# Patient Record
Sex: MALE | Race: WHITE | NOT HISPANIC OR LATINO | Employment: FULL TIME | URBAN - NONMETROPOLITAN AREA
[De-identification: names, ages, dates, MRNs, and addresses within clinical notes are randomized per-mention and may not be internally consistent; named-entity substitution may affect disease eponyms.]

---

## 2017-11-27 ENCOUNTER — TRANSCRIBE ORDERS (OUTPATIENT)
Dept: ADMINISTRATIVE | Facility: HOSPITAL | Age: 52
End: 2017-11-27

## 2017-11-27 DIAGNOSIS — Z12.9 SCREENING FOR CANCER: Primary | ICD-10-CM

## 2018-09-05 ENCOUNTER — TRANSCRIBE ORDERS (OUTPATIENT)
Dept: CT IMAGING | Facility: HOSPITAL | Age: 53
End: 2018-09-05

## 2018-09-05 ENCOUNTER — TELEPHONE (OUTPATIENT)
Dept: SURGERY | Facility: CLINIC | Age: 53
End: 2018-09-05

## 2018-09-05 DIAGNOSIS — Z12.9 SCREENING FOR CANCER: Primary | ICD-10-CM

## 2018-09-06 RX ORDER — BISACODYL 5 MG/1
10 TABLET, DELAYED RELEASE ORAL 2 TIMES DAILY
Qty: 4 TABLET | Refills: 0 | Status: SHIPPED | OUTPATIENT
Start: 2018-09-06 | End: 2018-09-07

## 2018-09-06 RX ORDER — POLYETHYLENE GLYCOL 1450
1 POWDER (GRAM) MISCELLANEOUS
Qty: 238 G | Refills: 0 | Status: SHIPPED | OUTPATIENT
Start: 2018-09-06 | End: 2018-09-06

## 2018-09-06 NOTE — TELEPHONE ENCOUNTER
I scheduled pt for open access screening colonoscopy on 09/24/2018 @  , all pertinent information mailed to pt. Rx's for bowel prep sent to [facility].

## 2018-09-11 ENCOUNTER — PREP FOR SURGERY (OUTPATIENT)
Dept: OTHER | Facility: HOSPITAL | Age: 53
End: 2018-09-11

## 2018-09-11 DIAGNOSIS — Z12.11 SCREEN FOR COLON CANCER: Primary | ICD-10-CM

## 2018-09-13 PROBLEM — Z12.11 SCREEN FOR COLON CANCER: Status: ACTIVE | Noted: 2018-09-13

## 2018-09-14 ENCOUNTER — HOSPITAL ENCOUNTER (OUTPATIENT)
Dept: CT IMAGING | Facility: HOSPITAL | Age: 53
Discharge: HOME | End: 2018-09-14
Admitting: INTERNAL MEDICINE

## 2018-09-14 DIAGNOSIS — Z12.9 SCREENING FOR CANCER: ICD-10-CM

## 2018-09-14 PROCEDURE — G0297 LDCT FOR LUNG CA SCREEN: HCPCS

## 2018-09-21 RX ORDER — LISINOPRIL 10 MG/1
10 TABLET ORAL DAILY
COMMUNITY

## 2018-09-24 ENCOUNTER — HOSPITAL ENCOUNTER (OUTPATIENT)
Facility: HOSPITAL | Age: 53
Setting detail: HOSPITAL OUTPATIENT SURGERY
Discharge: HOME | End: 2018-09-24
Attending: SURGERY | Admitting: SURGERY

## 2018-09-24 ENCOUNTER — ANESTHESIA (OUTPATIENT)
Dept: GASTROENTEROLOGY | Facility: HOSPITAL | Age: 53
End: 2018-09-24

## 2018-09-24 ENCOUNTER — ANESTHESIA EVENT (OUTPATIENT)
Dept: GASTROENTEROLOGY | Facility: HOSPITAL | Age: 53
End: 2018-09-24

## 2018-09-24 VITALS
HEART RATE: 58 BPM | BODY MASS INDEX: 21.76 KG/M2 | HEIGHT: 70 IN | WEIGHT: 152 LBS | RESPIRATION RATE: 18 BRPM | TEMPERATURE: 97.2 F | DIASTOLIC BLOOD PRESSURE: 89 MMHG | SYSTOLIC BLOOD PRESSURE: 151 MMHG | OXYGEN SATURATION: 99 %

## 2018-09-24 DIAGNOSIS — Z12.11 SCREEN FOR COLON CANCER: ICD-10-CM

## 2018-09-24 PROCEDURE — S0260 H&P FOR SURGERY: HCPCS | Performed by: SURGERY

## 2018-09-24 PROCEDURE — 25010000002 PROPOFOL 200 MG/20ML EMULSION: Performed by: NURSE ANESTHETIST, CERTIFIED REGISTERED

## 2018-09-24 RX ORDER — SODIUM CHLORIDE 0.9 % (FLUSH) 0.9 %
3 SYRINGE (ML) INJECTION AS NEEDED
Status: DISCONTINUED | OUTPATIENT
Start: 2018-09-24 | End: 2018-09-24 | Stop reason: HOSPADM

## 2018-09-24 RX ORDER — SODIUM CHLORIDE, SODIUM LACTATE, POTASSIUM CHLORIDE, CALCIUM CHLORIDE 600; 310; 30; 20 MG/100ML; MG/100ML; MG/100ML; MG/100ML
1000 INJECTION, SOLUTION INTRAVENOUS CONTINUOUS
Status: DISCONTINUED | OUTPATIENT
Start: 2018-09-24 | End: 2018-09-24 | Stop reason: HOSPADM

## 2018-09-24 RX ORDER — PROPOFOL 10 MG/ML
INJECTION, EMULSION INTRAVENOUS AS NEEDED
Status: DISCONTINUED | OUTPATIENT
Start: 2018-09-24 | End: 2018-09-24 | Stop reason: SURG

## 2018-09-24 RX ORDER — KETAMINE HYDROCHLORIDE 50 MG/ML
INJECTION, SOLUTION, CONCENTRATE INTRAMUSCULAR; INTRAVENOUS AS NEEDED
Status: DISCONTINUED | OUTPATIENT
Start: 2018-09-24 | End: 2018-09-24 | Stop reason: SURG

## 2018-09-24 RX ADMIN — SODIUM CHLORIDE, POTASSIUM CHLORIDE, SODIUM LACTATE AND CALCIUM CHLORIDE 1000 ML: 600; 310; 30; 20 INJECTION, SOLUTION INTRAVENOUS at 12:24

## 2018-09-24 RX ADMIN — PROPOFOL 30 MG: 10 INJECTION, EMULSION INTRAVENOUS at 14:09

## 2018-09-24 RX ADMIN — PROPOFOL 150 MG: 10 INJECTION, EMULSION INTRAVENOUS at 14:00

## 2018-09-24 RX ADMIN — PROPOFOL 20 MG: 10 INJECTION, EMULSION INTRAVENOUS at 13:57

## 2018-09-24 RX ADMIN — KETAMINE HYDROCHLORIDE 20 MG: 50 INJECTION, SOLUTION INTRAMUSCULAR; INTRAVENOUS at 13:57

## 2018-09-24 NOTE — H&P
Reason for Consultation:  Screening colonoscopy    Chief complaint :  Screening colonoscopy    Subjective .     History of present illness:  I did see the patient today as a consultation for evaluation and treatment of a need for screening colonoscopy.  There are no other complaints of.    Review of Systems:    Review of Systems - General ROS: negative for - chills, fatigue, fever, hot flashes, malaise or night sweats  Psychological ROS: negative for - behavioral disorder, disorientation, hallucinations, hostility or mood swings  ENT ROS: negative for - nasal polyps, oral lesions, sinus pain, sneezing or sore throat  Breast ROS: negative for - galactorrhea or new or changing breast lumps  Respiratory ROS: negative for - hemoptysis, orthopnea, pleuritic pain, sputum changes or stridor  Cardiovascular ROS: negative for - dyspnea on exertion, edema, irregular heartbeat, murmur, orthopnea, palpitations or rapid heart rate  Gastrointestinal ROS: negative for - change in stools, gas/bloating, hematemesis, melena or stool incontinence.  Genito-Urinary ROS: negative for - dysuria, genital ulcers, nocturia or pelvic pain  Musculoskeletal ROS: negative for - gait disturbance or muscle pain  Neurological ROS: negative for - dizziness, gait disturbance, memory loss, numbness/tingling or seizures      Objective     Vital Signs   Temp:  [98 °F (36.7 °C)] 98 °F (36.7 °C)  Heart Rate:  [67] 67  Resp:  [18] 18  BP: (126)/(84) 126/84    Physical Exam:     General Appearance:    Alert, cooperative, in no acute distress   Head:    Normocephalic, without obvious abnormality, atraumatic   Eyes:            Lids and lashes normal, conjunctivae and sclerae normal, no   icterus, no pallor, corneas clear, PERRLA   Ears:    Ears appear intact with no abnormalities noted   Throat:   No oral lesions, no thrush, oral mucosa moist   Neck:   No adenopathy, supple, trachea midline, no thyromegaly, no   carotid bruit, no JVD   Back:     No  kyphosis present, no scoliosis present, no skin lesions,      erythema or scars, no tenderness to percussion or                   palpation,   range of motion normal   Lungs:     Clear to auscultation,respirations regular, even and                  unlabored    Heart:    Regular rhythm and normal rate, normal S1 and S2, no            murmur, no gallop, no rub, no click   Chest Wall:    No abnormalities observed   Abdomen:     Normal bowel sounds, no masses, no organomegaly, soft        non-tender, non-distended, no guarding, there is no evidence of tenderness   Extremities:   Moves all extremities well, no edema, no cyanosis, no             redness   Pulses:   Pulses palpable and equal bilaterally   Skin:   No bleeding, bruising or rash   Lymph nodes:   No palpable adenopathy   Neurologic:   Cranial nerves 2 - 12 grossly intact, sensation intact, DTR       present and equal bilaterally           Assessment/Plan     Screening colonoscopy      I did have a detailed and extensive discussion with the patient in the office today.  The full risks and benefits of operative versus nonoperative intervention were discussed with the paient, they understand, agree, and wish to proceed with the surgical treatment plan of colonoscopy.      Xavi Lyman MD

## 2018-09-24 NOTE — DISCHARGE INSTRUCTIONS
Please follow all post op instructions and follow up appointment time from your physician's office included in your discharge packet.  .   No pushing, pulling, tugging,  heavy lifting, or strenuous activity.  No major decision making, driving, or drinking alcoholic beverages for 24 hours. ( due to the medications you have  received)  Always use good hand hygiene/washing techniques.  NO driving while taking pain medications.To assist you in voiding:  Drink plenty of fluids  Listen to running water while attempting to void.    If you are unable to urinate and you have an uncomfortable urge to void or it has been   6 hours since you were discharged, return to the Emergency Room

## 2018-09-24 NOTE — ANESTHESIA POSTPROCEDURE EVALUATION
Patient: Mihir Jha    Procedure Summary     Date:  09/24/18 Room / Location:  [facility] ENDOSCOPY 3 / [facility] ENDOSCOPY    Anesthesia Start:  1353 Anesthesia Stop:  1413    Procedure:  COLONOSCOPY WITH HOT BIOPSY POLYPECTOMY (N/A ) Diagnosis:       Screen for colon cancer      (Screen for colon cancer [Z12.11])    Surgeon:  Xavi Lyman MD Provider:  Sam Holloway CRNA    Anesthesia Type:  MAC ASA Status:  2          Anesthesia Type: MAC  Last vitals  BP   126/84 (09/24/18 1213)   Temp   98 °F (36.7 °C) (09/24/18 1213)   Pulse   67 (09/24/18 1213)   Resp   18 (09/24/18 1213)     SpO2   98 % (09/24/18 1213)     Post Anesthesia Care and Evaluation    Patient location during evaluation: PHASE II  Patient participation: complete - patient participated  Level of consciousness: awake and alert  Pain score: 0  Pain management: satisfactory to patient  Airway patency: patent  Anesthetic complications: No anesthetic complications  PONV Status: none  Cardiovascular status: acceptable and hemodynamically stable  Respiratory status: acceptable  Hydration status: acceptable

## 2018-09-24 NOTE — ANESTHESIA PREPROCEDURE EVALUATION
Anesthesia Evaluation     Patient summary reviewed and Nursing notes reviewed   NPO Solid Status: > 8 hours  NPO Liquid Status: > 6 hours           Airway   Mallampati: II  TM distance: >3 FB  Neck ROM: full  No difficulty expected  Dental - normal exam   (+) edentulous    Pulmonary - normal exam   (+) a smoker Current Abstained day of surgery,   Cardiovascular - normal exam  Exercise tolerance: good (4-7 METS)    (+) hypertension,       Neuro/Psych- negative ROS  GI/Hepatic/Renal/Endo    (+)  GERD,      Musculoskeletal     Abdominal  - normal exam    Bowel sounds: normal.   Substance History - negative use     OB/GYN negative ob/gyn ROS         Other   (+) arthritis                     Anesthesia Plan    ASA 2     MAC     intravenous induction   Anesthetic plan, all risks, benefits, and alternatives have been provided, discussed and informed consent has been obtained with: patient.

## 2018-09-28 LAB
LAB AP CASE REPORT: NORMAL
PATH REPORT.FINAL DX SPEC: NORMAL

## 2019-07-26 ENCOUNTER — TRANSCRIBE ORDERS (OUTPATIENT)
Dept: CT IMAGING | Facility: HOSPITAL | Age: 54
End: 2019-07-26

## 2019-07-26 DIAGNOSIS — Z87.891 PERSONAL HISTORY OF TOBACCO USE, PRESENTING HAZARDS TO HEALTH: Primary | ICD-10-CM

## 2019-09-18 ENCOUNTER — HOSPITAL ENCOUNTER (OUTPATIENT)
Dept: CT IMAGING | Facility: HOSPITAL | Age: 54
Discharge: HOME | End: 2019-09-18
Admitting: INTERNAL MEDICINE

## 2019-09-18 DIAGNOSIS — Z87.891 PERSONAL HISTORY OF TOBACCO USE, PRESENTING HAZARDS TO HEALTH: ICD-10-CM

## 2019-09-18 PROCEDURE — G0297 LDCT FOR LUNG CA SCREEN: HCPCS

## 2020-09-18 ENCOUNTER — TRANSCRIBE ORDERS (OUTPATIENT)
Dept: ADMINISTRATIVE | Facility: HOSPITAL | Age: 55
End: 2020-09-18

## 2020-09-18 DIAGNOSIS — Z12.9 SCREENING FOR CANCER: Primary | ICD-10-CM

## 2021-09-07 ENCOUNTER — APPOINTMENT (OUTPATIENT)
Dept: CT IMAGING | Facility: HOSPITAL | Age: 56
End: 2021-09-07

## 2021-09-07 ENCOUNTER — HOSPITAL ENCOUNTER (OUTPATIENT)
Facility: HOSPITAL | Age: 56
Discharge: HOME | End: 2021-09-08
Attending: EMERGENCY MEDICINE | Admitting: UROLOGY

## 2021-09-07 DIAGNOSIS — N20.0 KIDNEY STONE ON LEFT SIDE: Primary | ICD-10-CM

## 2021-09-07 LAB
ALBUMIN SERPL-MCNC: 4.8 G/DL (ref 3.5–5.2)
ALBUMIN/GLOB SERPL: 2.1 G/DL
ALP SERPL-CCNC: 77 U/L (ref 39–117)
ALT SERPL W P-5'-P-CCNC: 34 U/L (ref 1–41)
ANION GAP SERPL CALCULATED.3IONS-SCNC: 12 MMOL/L (ref 5–15)
AST SERPL-CCNC: 23 U/L (ref 1–40)
BACTERIA UR QL AUTO: ABNORMAL /HPF
BASOPHILS # BLD AUTO: 0.04 10*3/MM3 (ref 0–0.2)
BASOPHILS NFR BLD AUTO: 0.5 % (ref 0–1.5)
BILIRUB SERPL-MCNC: 0.4 MG/DL (ref 0–1.2)
BILIRUB UR QL STRIP: NEGATIVE
BUN SERPL-MCNC: 27 MG/DL (ref 6–20)
BUN/CREAT SERPL: 23.9 (ref 7–25)
CALCIUM SPEC-SCNC: 10.2 MG/DL (ref 8.6–10.5)
CHLORIDE SERPL-SCNC: 102 MMOL/L (ref 98–107)
CLARITY UR: ABNORMAL
CO2 SERPL-SCNC: 25 MMOL/L (ref 22–29)
COLOR UR: YELLOW
CREAT SERPL-MCNC: 1.13 MG/DL (ref 0.76–1.27)
DEPRECATED RDW RBC AUTO: 42.2 FL (ref 37–54)
EOSINOPHIL # BLD AUTO: 0.04 10*3/MM3 (ref 0–0.4)
EOSINOPHIL NFR BLD AUTO: 0.5 % (ref 0.3–6.2)
ERYTHROCYTE [DISTWIDTH] IN BLOOD BY AUTOMATED COUNT: 12.8 % (ref 12.3–15.4)
GFR SERPL CREATININE-BSD FRML MDRD: 67 ML/MIN/1.73
GLOBULIN UR ELPH-MCNC: 2.3 GM/DL
GLUCOSE SERPL-MCNC: 126 MG/DL (ref 65–99)
GLUCOSE UR STRIP-MCNC: NEGATIVE MG/DL
HCT VFR BLD AUTO: 45.8 % (ref 37.5–51)
HGB BLD-MCNC: 15.7 G/DL (ref 13–17.7)
HGB UR QL STRIP.AUTO: ABNORMAL
HOLD SPECIMEN: NORMAL
HOLD SPECIMEN: NORMAL
HYALINE CASTS UR QL AUTO: ABNORMAL /LPF
IMM GRANULOCYTES # BLD AUTO: 0.02 10*3/MM3 (ref 0–0.05)
IMM GRANULOCYTES NFR BLD AUTO: 0.2 % (ref 0–0.5)
KETONES UR QL STRIP: NEGATIVE
LEUKOCYTE ESTERASE UR QL STRIP.AUTO: NEGATIVE
LIPASE SERPL-CCNC: 32 U/L (ref 13–60)
LYMPHOCYTES # BLD AUTO: 1.03 10*3/MM3 (ref 0.7–3.1)
LYMPHOCYTES NFR BLD AUTO: 12.5 % (ref 19.6–45.3)
MCH RBC QN AUTO: 30.8 PG (ref 26.6–33)
MCHC RBC AUTO-ENTMCNC: 34.3 G/DL (ref 31.5–35.7)
MCV RBC AUTO: 89.8 FL (ref 79–97)
MONOCYTES # BLD AUTO: 0.48 10*3/MM3 (ref 0.1–0.9)
MONOCYTES NFR BLD AUTO: 5.8 % (ref 5–12)
NEUTROPHILS NFR BLD AUTO: 6.62 10*3/MM3 (ref 1.7–7)
NEUTROPHILS NFR BLD AUTO: 80.5 % (ref 42.7–76)
NITRITE UR QL STRIP: NEGATIVE
NRBC BLD AUTO-RTO: 0 /100 WBC (ref 0–0.2)
PH UR STRIP.AUTO: 6.5 [PH] (ref 5–8)
PLATELET # BLD AUTO: 209 10*3/MM3 (ref 140–450)
PMV BLD AUTO: 9.3 FL (ref 6–12)
POTASSIUM SERPL-SCNC: 4 MMOL/L (ref 3.5–5.2)
PROT SERPL-MCNC: 7.1 G/DL (ref 6–8.5)
PROT UR QL STRIP: ABNORMAL
RBC # BLD AUTO: 5.1 10*6/MM3 (ref 4.14–5.8)
RBC # UR: ABNORMAL /HPF
REF LAB TEST METHOD: ABNORMAL
SARS-COV-2 RNA PNL SPEC NAA+PROBE: NOT DETECTED
SODIUM SERPL-SCNC: 139 MMOL/L (ref 136–145)
SP GR UR STRIP: 1.02 (ref 1–1.03)
SQUAMOUS #/AREA URNS HPF: ABNORMAL /HPF
UROBILINOGEN UR QL STRIP: ABNORMAL
WBC # BLD AUTO: 8.23 10*3/MM3 (ref 3.4–10.8)
WBC UR QL AUTO: ABNORMAL /HPF
WHOLE BLOOD HOLD SPECIMEN: NORMAL
WHOLE BLOOD HOLD SPECIMEN: NORMAL

## 2021-09-07 PROCEDURE — G0378 HOSPITAL OBSERVATION PER HR: HCPCS

## 2021-09-07 PROCEDURE — 25010000002 IOPAMIDOL 61 % SOLUTION: Performed by: EMERGENCY MEDICINE

## 2021-09-07 PROCEDURE — 74177 CT ABD & PELVIS W/CONTRAST: CPT

## 2021-09-07 PROCEDURE — 87635 SARS-COV-2 COVID-19 AMP PRB: CPT | Performed by: PHYSICIAN ASSISTANT

## 2021-09-07 PROCEDURE — 99284 EMERGENCY DEPT VISIT MOD MDM: CPT

## 2021-09-07 PROCEDURE — 96375 TX/PRO/DX INJ NEW DRUG ADDON: CPT

## 2021-09-07 PROCEDURE — 96376 TX/PRO/DX INJ SAME DRUG ADON: CPT

## 2021-09-07 PROCEDURE — 85025 COMPLETE CBC W/AUTO DIFF WBC: CPT | Performed by: EMERGENCY MEDICINE

## 2021-09-07 PROCEDURE — 25010000002 ONDANSETRON PER 1 MG: Performed by: EMERGENCY MEDICINE

## 2021-09-07 PROCEDURE — 99219 PR INITIAL OBSERVATION CARE/DAY 50 MINUTES: CPT | Performed by: EMERGENCY MEDICINE

## 2021-09-07 PROCEDURE — 25010000002 MORPHINE SULFATE (PF) 2 MG/ML SOLUTION: Performed by: EMERGENCY MEDICINE

## 2021-09-07 PROCEDURE — 25010000002 KETOROLAC TROMETHAMINE PER 15 MG: Performed by: EMERGENCY MEDICINE

## 2021-09-07 PROCEDURE — 25010000002 ENOXAPARIN PER 10 MG: Performed by: EMERGENCY MEDICINE

## 2021-09-07 PROCEDURE — 96372 THER/PROPH/DIAG INJ SC/IM: CPT

## 2021-09-07 PROCEDURE — 96361 HYDRATE IV INFUSION ADD-ON: CPT

## 2021-09-07 PROCEDURE — 81001 URINALYSIS AUTO W/SCOPE: CPT | Performed by: EMERGENCY MEDICINE

## 2021-09-07 PROCEDURE — 25010000002 HYDROMORPHONE 1 MG/ML SOLUTION: Performed by: EMERGENCY MEDICINE

## 2021-09-07 PROCEDURE — C9803 HOPD COVID-19 SPEC COLLECT: HCPCS

## 2021-09-07 PROCEDURE — 25010000002 MORPHINE PER 10 MG: Performed by: EMERGENCY MEDICINE

## 2021-09-07 PROCEDURE — 83690 ASSAY OF LIPASE: CPT | Performed by: EMERGENCY MEDICINE

## 2021-09-07 PROCEDURE — 80053 COMPREHEN METABOLIC PANEL: CPT | Performed by: EMERGENCY MEDICINE

## 2021-09-07 PROCEDURE — 96374 THER/PROPH/DIAG INJ IV PUSH: CPT

## 2021-09-07 RX ORDER — ACETAMINOPHEN 650 MG/1
650 SUPPOSITORY RECTAL EVERY 4 HOURS PRN
Status: DISCONTINUED | OUTPATIENT
Start: 2021-09-07 | End: 2021-09-08 | Stop reason: HOSPADM

## 2021-09-07 RX ORDER — ACETAMINOPHEN 160 MG/5ML
650 SOLUTION ORAL EVERY 4 HOURS PRN
Status: DISCONTINUED | OUTPATIENT
Start: 2021-09-07 | End: 2021-09-08 | Stop reason: HOSPADM

## 2021-09-07 RX ORDER — CEFAZOLIN SODIUM 2 G/50ML
2 SOLUTION INTRAVENOUS ONCE
Status: COMPLETED | OUTPATIENT
Start: 2021-09-08 | End: 2021-09-08

## 2021-09-07 RX ORDER — TAMSULOSIN HYDROCHLORIDE 0.4 MG/1
0.4 CAPSULE ORAL DAILY
Status: DISCONTINUED | OUTPATIENT
Start: 2021-09-07 | End: 2021-09-08 | Stop reason: HOSPADM

## 2021-09-07 RX ORDER — MORPHINE SULFATE 4 MG/ML
4 INJECTION, SOLUTION INTRAMUSCULAR; INTRAVENOUS ONCE
Status: COMPLETED | OUTPATIENT
Start: 2021-09-07 | End: 2021-09-07

## 2021-09-07 RX ORDER — SODIUM CHLORIDE, SODIUM LACTATE, POTASSIUM CHLORIDE, CALCIUM CHLORIDE 600; 310; 30; 20 MG/100ML; MG/100ML; MG/100ML; MG/100ML
100 INJECTION, SOLUTION INTRAVENOUS CONTINUOUS
Status: DISCONTINUED | OUTPATIENT
Start: 2021-09-07 | End: 2021-09-08 | Stop reason: HOSPADM

## 2021-09-07 RX ORDER — ONDANSETRON 2 MG/ML
4 INJECTION INTRAMUSCULAR; INTRAVENOUS ONCE
Status: COMPLETED | OUTPATIENT
Start: 2021-09-07 | End: 2021-09-07

## 2021-09-07 RX ORDER — CEFAZOLIN SODIUM 2 G/50ML
2 SOLUTION INTRAVENOUS ONCE
Status: DISCONTINUED | OUTPATIENT
Start: 2021-09-07 | End: 2021-09-07

## 2021-09-07 RX ORDER — SODIUM CHLORIDE 9 MG/ML
100 INJECTION, SOLUTION INTRAVENOUS CONTINUOUS
Status: DISCONTINUED | OUTPATIENT
Start: 2021-09-07 | End: 2021-09-08

## 2021-09-07 RX ORDER — MORPHINE SULFATE 2 MG/ML
2 INJECTION, SOLUTION INTRAMUSCULAR; INTRAVENOUS EVERY 4 HOURS PRN
Status: DISCONTINUED | OUTPATIENT
Start: 2021-09-07 | End: 2021-09-08 | Stop reason: HOSPADM

## 2021-09-07 RX ORDER — SODIUM CHLORIDE 0.9 % (FLUSH) 0.9 %
10 SYRINGE (ML) INJECTION EVERY 12 HOURS SCHEDULED
Status: DISCONTINUED | OUTPATIENT
Start: 2021-09-07 | End: 2021-09-08 | Stop reason: HOSPADM

## 2021-09-07 RX ORDER — SODIUM CHLORIDE 0.9 % (FLUSH) 0.9 %
10 SYRINGE (ML) INJECTION AS NEEDED
Status: DISCONTINUED | OUTPATIENT
Start: 2021-09-07 | End: 2021-09-08 | Stop reason: HOSPADM

## 2021-09-07 RX ORDER — ONDANSETRON 2 MG/ML
4 INJECTION INTRAMUSCULAR; INTRAVENOUS EVERY 6 HOURS PRN
Status: DISCONTINUED | OUTPATIENT
Start: 2021-09-07 | End: 2021-09-08 | Stop reason: HOSPADM

## 2021-09-07 RX ORDER — KETOROLAC TROMETHAMINE 30 MG/ML
30 INJECTION, SOLUTION INTRAMUSCULAR; INTRAVENOUS EVERY 6 HOURS PRN
Status: DISCONTINUED | OUTPATIENT
Start: 2021-09-07 | End: 2021-09-08 | Stop reason: HOSPADM

## 2021-09-07 RX ORDER — ACETAMINOPHEN 325 MG/1
650 TABLET ORAL EVERY 4 HOURS PRN
Status: DISCONTINUED | OUTPATIENT
Start: 2021-09-07 | End: 2021-09-08 | Stop reason: HOSPADM

## 2021-09-07 RX ADMIN — MORPHINE SULFATE 4 MG: 4 INJECTION INTRAVENOUS at 11:49

## 2021-09-07 RX ADMIN — SODIUM CHLORIDE 1000 ML: 9 INJECTION, SOLUTION INTRAVENOUS at 11:48

## 2021-09-07 RX ADMIN — KETOROLAC TROMETHAMINE 30 MG: 30 INJECTION, SOLUTION INTRAMUSCULAR; INTRAVENOUS at 17:10

## 2021-09-07 RX ADMIN — ONDANSETRON 4 MG: 2 INJECTION INTRAMUSCULAR; INTRAVENOUS at 11:49

## 2021-09-07 RX ADMIN — MORPHINE SULFATE 2 MG: 2 INJECTION, SOLUTION INTRAMUSCULAR; INTRAVENOUS at 20:06

## 2021-09-07 RX ADMIN — SODIUM CHLORIDE 100 ML/HR: 9 INJECTION, SOLUTION INTRAVENOUS at 17:15

## 2021-09-07 RX ADMIN — TAMSULOSIN HYDROCHLORIDE 0.4 MG: 0.4 CAPSULE ORAL at 17:56

## 2021-09-07 RX ADMIN — HYDROMORPHONE HYDROCHLORIDE 1 MG: 1 INJECTION, SOLUTION INTRAMUSCULAR; INTRAVENOUS; SUBCUTANEOUS at 13:51

## 2021-09-07 RX ADMIN — IOPAMIDOL 100 ML: 612 INJECTION, SOLUTION INTRAVENOUS at 12:58

## 2021-09-07 RX ADMIN — ENOXAPARIN SODIUM 40 MG: 40 INJECTION SUBCUTANEOUS at 17:57

## 2021-09-07 NOTE — PROGRESS NOTES
I have reviewed patient's chart and will perform left ureteroscopy with laser lithotripsy tomorrow.  Please keep patient n.p.o. at midnight.  Formal consult note to follow.

## 2021-09-07 NOTE — PLAN OF CARE
Goal Outcome Evaluation:  Plan of Care Reviewed With: patient        Progress: no change  Outcome Summary: New admit from ED.  Pain treated per MAR.  Procedure with Dr. Prieto scheduled for tomorrow.

## 2021-09-07 NOTE — ED PROVIDER NOTES
Subjective   56-year-old male presents to emergency department with sudden onset of left lower quadrant pain.  States that the pain began earlier today when he was at work where he was weed eating.  He states that the pain is originally accompanied by slight nausea.  He states that the pain is sharp and consistent and states leaning forward slightly alleviates the pain, and palpating the area exacerbates the pain.  He is a pertinent past medical history of mesh hernia repair in the same spot.  Additionally, he has a past medical history of kidney stones, but says that this pain does not feel the same as a previous kidney stone.      History provided by:  Patient   used: No        Review of Systems   Gastrointestinal: Positive for abdominal pain and nausea.        Left lower quadrant, sharp in nature.   All other systems reviewed and are negative.      Past Medical History:   Diagnosis Date   • Arthritis     SHOULDERS   • Body piercing     LEFT EAR   • Colon polyps    • GERD (gastroesophageal reflux disease)    • Hard of hearing    • History of stomach ulcers    • Hypertension    • Kidney stones     DRINKING PEPSI   • No natural teeth    • Tinnitus    • Wears glasses     READING ONLY       Allergies   Allergen Reactions   • Sulfa Antibiotics Anaphylaxis     SULFA- ITSELF NOT JUST THE ANTIBIOTIC   • Tetanus Toxoids Other (See Comments)     LOCK JAW       Past Surgical History:   Procedure Laterality Date   • COLONOSCOPY     • COLONOSCOPY N/A 9/24/2018    Procedure: COLONOSCOPY WITH HOT BIOPSY POLYPECTOMY;  Surgeon: Xavi Lyman MD;  Location: [facility] ENDOSCOPY;  Service: Gastroenterology   • KIDNEY STONE SURGERY      X3   • KNEE ARTHROSCOPY Left     WITH REPAIR   • MULTIPLE TOOTH EXTRACTIONS         History reviewed. No pertinent family history.    Social History     Socioeconomic History   • Marital status:      Spouse name: Not on file   • Number of children: Not on file   • Years of  education: Not on file   • Highest education level: Not on file   Tobacco Use   • Smoking status: Former Smoker     Types: Cigarettes     Quit date:      Years since quittin.6   • Smokeless tobacco: Never Used   Substance and Sexual Activity   • Alcohol use: No   • Drug use: Yes     Types: Marijuana     Comment: DAILY   • Sexual activity: Defer           Objective   Physical Exam  Vitals and nursing note reviewed.   Constitutional:       General: He is in acute distress.      Appearance: He is well-developed.   HENT:      Head: Normocephalic and atraumatic.   Cardiovascular:      Rate and Rhythm: Normal rate and regular rhythm.   Pulmonary:      Effort: Pulmonary effort is normal.      Breath sounds: Normal breath sounds.   Abdominal:      General: Abdomen is flat. Bowel sounds are normal.      Palpations: Abdomen is soft.      Tenderness: There is abdominal tenderness in the left lower quadrant. There is no guarding or rebound.      Hernia: No hernia is present.   Genitourinary:     Testes:         Left: Tenderness present. Mass or swelling not present.   Musculoskeletal:         General: Normal range of motion.      Cervical back: Normal range of motion.   Skin:     General: Skin is warm and dry.   Neurological:      Mental Status: He is alert and oriented to person, place, and time.   Psychiatric:         Behavior: Behavior normal.         Thought Content: Thought content normal.         Judgment: Judgment normal.         Procedures           ED Course                                           MDM  Number of Diagnoses or Management Options  Kidney stone on left side: new and requires workup     Amount and/or Complexity of Data Reviewed  Clinical lab tests: reviewed  Tests in the radiology section of CPT®: reviewed  Discuss the patient with other providers: yes    Risk of Complications, Morbidity, and/or Mortality  Presenting problems: low  Diagnostic procedures: low  Management options: low    Patient  Progress  Patient progress: stable      Final diagnoses:   Kidney stone on left side       ED Disposition  ED Disposition     ED Disposition Condition Comment    Decision to Admit  Level of Care: Med/Surg [1]   Diagnosis: Kidney stone on left side [[id number]]   Admitting Physician: JEREMIAS ELKINS [[id number]]   Attending Physician: JEREMIAS ELKINS [[id number]]            No follow-up provider specified.       Medication List      No changes were made to your prescriptions during this visit.          Gus Chandler Jr., PA-C  09/07/21 1418

## 2021-09-07 NOTE — H&P
[facility]   HISTORY AND PHYSICAL    Patient Name: Mihir Jha  : 1965  MRN: [id number]  Primary Care Physician: Kenia Kwok MD  Date of admission: 2021      Subjective   Subjective     Chief Complaint: Sharp pain in inguinal region    HPI: Mihir Jha is a 56 y.o. male with a history of a Hypertension, GERD, and four previous kidney stones, who presents to the emergency department with a sharp pain in his left inguinal region. He states that this pain is a 6/10 in severity and began around 9:30 this morning. He rides a  for work, and believes the rumbling of the mower precipitated this pain. He denies blood in his urine, denies pain on urination, admits subjective chills over the weekend, denies acute changes in bowel habits, though does note its been difficult to defecate since his hernia repair surgery several years ago.      Review of Systems A full 14 point review of systems was performed and all pertinent positives and negatives are noted in the HPI.      Personal History     Past Medical History:   Diagnosis Date   • Arthritis     SHOULDERS   • Body piercing     LEFT EAR   • Colon polyps    • GERD (gastroesophageal reflux disease)    • Hard of hearing    • History of stomach ulcers    • Hypertension    • Kidney stones     DRINKING PEPSI   • No natural teeth    • Tinnitus    • Wears glasses     READING ONLY       Past Surgical History:   Procedure Laterality Date   • COLONOSCOPY     • COLONOSCOPY N/A 2018    Procedure: COLONOSCOPY WITH HOT BIOPSY POLYPECTOMY;  Surgeon: Xavi Lyman MD;  Location: [facility] ENDOSCOPY;  Service: Gastroenterology   • KIDNEY STONE SURGERY      X3   • KNEE ARTHROSCOPY Left     WITH REPAIR   • MULTIPLE TOOTH EXTRACTIONS         Family History: family history is not on file. Otherwise pertinent FHx was reviewed and unremarkable.     Social History:  reports that he quit smoking about 13 years ago. His smoking use  included cigarettes. He has never used smokeless tobacco. He reports current drug use. Drug: Marijuana. He reports that he does not drink alcohol.    Medications:  Medications Prior to Admission   Medication Sig Dispense Refill Last Dose   • lisinopril (PRINIVIL,ZESTRIL) 10 MG tablet Take 10 mg by mouth Daily. 0.5 TABLET DAILY   9/7/2021 at Unknown time       Allergies   Allergen Reactions   • Sulfa Antibiotics Anaphylaxis     SULFA- ITSELF NOT JUST THE ANTIBIOTIC   • Tetanus Toxoids Other (See Comments)     LOCK JAW     Objective   Objective     Vital Signs:   Temp:  [98 °F (36.7 °C)] 98 °F (36.7 °C)  Heart Rate:  [74-87] 74  Resp:  [18] 18  BP: (152-185)/() 152/87        Physical Exam   General: NAD, resting in bed  HEENT: EOMI, NC/AT  Heart: regular  Lungs: nonlabored  Abdomen: Soft, nondistended, nontender  Extremities: No edema  Neurological: A&O x3, moves all extremities  Psychological: Mood and affect appropriate, speech is coherent  Skin: warm, dry    Results Reviewed:I have personally reviewed most recent labs and imaging.    Results from last 7 days   Lab Units 09/07/21  1146   WBC 10*3/mm3 8.23   HEMOGLOBIN g/dL 15.7   HEMATOCRIT % 45.8   PLATELETS 10*3/mm3 209     Results from last 7 days   Lab Units 09/07/21  1146   SODIUM mmol/L 139   POTASSIUM mmol/L 4.0   CHLORIDE mmol/L 102   CO2 mmol/L 25.0   BUN mg/dL 27*   CREATININE mg/dL 1.13   GLUCOSE mg/dL 126*   CALCIUM mg/dL 10.2   ALT (SGPT) U/L 34   AST (SGOT) U/L 23     Estimated Creatinine Clearance: 79.1 mL/min (by C-G formula based on SCr of 1.13 mg/dL).  Brief Urine Lab Results  (Last result in the past 365 days)      Color   Clarity   Blood   Leuk Est   Nitrite   Protein   CREAT   Urine HCG        09/07/21 1208 Yellow Hazy Small (1+) Negative Negative >=300 mg/dL (3+)             Imaging Results (Last 24 Hours)     Procedure Component Value Units Date/Time    CT Abdomen Pelvis With Contrast [[id number]] Collected: 09/07/21 1302     Updated:  "09/07/21 1307    Narrative:      PROCEDURE: CT ABDOMEN PELVIS W CONTRAST-     HISTORY:  llq left groin pain     COMPARISON:  None .     TECHNIQUE: Multiple axial CT images were obtained from the lung bases  through the pubic symphysis following the administration of Isovue 300  contrast.      FINDINGS:      ABDOMEN: The lung bases are clear. The heart is normal in size. The  liver is normal. The spleen is unremarkable. No adrenal mass is present.   The pancreas is normal. There is moderate left hydronephrosis and  ureteral dilatation. A 12 mm stone is seen in the distal left ureter. A  cyst is seen in the superior pole of the right kidney. The aorta is  normal in caliber. There is no free fluid or adenopathy. The abdominal  portions of the GI tract are unremarkable with no evidence of  obstruction.     PELVIS: The appendix is not identified.  The urinary bladder is  unremarkable. There is no significant free fluid or adenopathy.       Impression:      12 mm stone in the distal left ureter producing moderate  hydronephrosis and ureteral dilatation.     375.88 mGy.cm        This study was performed with techniques to keep radiation doses as low  as reasonably achievable (ALARA). Individualized dose reduction  techniques using automated exposure control or adjustment of mA and/or  kV according to the patient size were employed.      This report was finalized on 9/7/2021 1:04 PM by Felix Clayton M.D..            Assessment/Plan   Assessment / Plan     Brief Patient Summary:Mihir Jha is a 56 y.o. male with a history of a Hypertension, GERD, and four previous kidney stones, who presents to the emergency department with a sharp pain in his left inguinal region. He states that this pain is a 6/10 in severity and began around 9:30 this morning. He rides a  for work, and believes the rumbling of the mower may have \"shook a stone lose\" and precipitated this pain. He denied blood in his urine, denied " "pain on urination, admmited subjective chills over the weekend, denied acute changes in bowel habits. A CT Abdomen Pelvis with Contrast was performed and showed a 12mm stone in the distal left ureter, moderate left hydronephrosis, and a cyst in the superior pole of the right kidney. The patient was started on a calcium free IVF, Ketoralac for pain management, and tamsulosin. The patient will be NPO after midnight tonight.    Active Hospital Problems:  1. Nephrolithiasis   2. HTN    No notes have been filed under this hospital service.  Service: Hospitalist    Plan:  1. Pain Management.  2. Consult Urology.      DVT prophylaxis:  LMWH    CODE STATUS: Full    Electronically signed by Robert Scanlon, Medical Student, 09/07/21, 3:26 PM EDT.    I have personally seen and examined the patient.  I have discussed the case with student Dr. Jones and agree with his history and assessment plan.  Patient reports acute onset of left lower quadrant abdominal pain earlier today.  He denies fever, dysuria, or other acute issues.  He is requesting a drink.  Pain is well controlled.  He reports an allergy to sulfur without rash, itch, or throat swelling.  He describes it as a sensation of \"gasping for air\" when someone lights a match close to him. Exam was personally documented by myself as above.    Assessment and plan:  1.  L-sided obstructive uropathy with moderate hydronephrosis    -Pain control, IV fluids, urology consultation  -Trial Flomax  -Lovenox for DVT prophylaxis, observation, full code    "

## 2021-09-08 ENCOUNTER — ANESTHESIA (OUTPATIENT)
Dept: PERIOP | Facility: HOSPITAL | Age: 56
End: 2021-09-08

## 2021-09-08 ENCOUNTER — ANESTHESIA EVENT (OUTPATIENT)
Dept: PERIOP | Facility: HOSPITAL | Age: 56
End: 2021-09-08

## 2021-09-08 VITALS
HEIGHT: 70 IN | OXYGEN SATURATION: 97 % | HEART RATE: 65 BPM | SYSTOLIC BLOOD PRESSURE: 163 MMHG | DIASTOLIC BLOOD PRESSURE: 95 MMHG | BODY MASS INDEX: 24.11 KG/M2 | WEIGHT: 168.43 LBS | RESPIRATION RATE: 16 BRPM | TEMPERATURE: 97.8 F

## 2021-09-08 LAB
ANION GAP SERPL CALCULATED.3IONS-SCNC: 8.1 MMOL/L (ref 5–15)
BASOPHILS # BLD AUTO: 0.03 10*3/MM3 (ref 0–0.2)
BASOPHILS NFR BLD AUTO: 0.5 % (ref 0–1.5)
BUN SERPL-MCNC: 22 MG/DL (ref 6–20)
BUN/CREAT SERPL: 21.4 (ref 7–25)
CALCIUM SPEC-SCNC: 8.8 MG/DL (ref 8.6–10.5)
CHLORIDE SERPL-SCNC: 107 MMOL/L (ref 98–107)
CO2 SERPL-SCNC: 23.9 MMOL/L (ref 22–29)
CREAT SERPL-MCNC: 1.03 MG/DL (ref 0.76–1.27)
DEPRECATED RDW RBC AUTO: 44.7 FL (ref 37–54)
EOSINOPHIL # BLD AUTO: 0.1 10*3/MM3 (ref 0–0.4)
EOSINOPHIL NFR BLD AUTO: 1.8 % (ref 0.3–6.2)
ERYTHROCYTE [DISTWIDTH] IN BLOOD BY AUTOMATED COUNT: 13.2 % (ref 12.3–15.4)
GFR SERPL CREATININE-BSD FRML MDRD: 74 ML/MIN/1.73
GLUCOSE SERPL-MCNC: 106 MG/DL (ref 65–99)
HCT VFR BLD AUTO: 41.9 % (ref 37.5–51)
HGB BLD-MCNC: 14.1 G/DL (ref 13–17.7)
IMM GRANULOCYTES # BLD AUTO: 0.02 10*3/MM3 (ref 0–0.05)
IMM GRANULOCYTES NFR BLD AUTO: 0.4 % (ref 0–0.5)
LYMPHOCYTES # BLD AUTO: 1.42 10*3/MM3 (ref 0.7–3.1)
LYMPHOCYTES NFR BLD AUTO: 25.7 % (ref 19.6–45.3)
MCH RBC QN AUTO: 30.7 PG (ref 26.6–33)
MCHC RBC AUTO-ENTMCNC: 33.7 G/DL (ref 31.5–35.7)
MCV RBC AUTO: 91.3 FL (ref 79–97)
MONOCYTES # BLD AUTO: 0.5 10*3/MM3 (ref 0.1–0.9)
MONOCYTES NFR BLD AUTO: 9 % (ref 5–12)
NEUTROPHILS NFR BLD AUTO: 3.46 10*3/MM3 (ref 1.7–7)
NEUTROPHILS NFR BLD AUTO: 62.6 % (ref 42.7–76)
NRBC BLD AUTO-RTO: 0 /100 WBC (ref 0–0.2)
PLATELET # BLD AUTO: 169 10*3/MM3 (ref 140–450)
PMV BLD AUTO: 9.4 FL (ref 6–12)
POTASSIUM SERPL-SCNC: 4.2 MMOL/L (ref 3.5–5.2)
RBC # BLD AUTO: 4.59 10*6/MM3 (ref 4.14–5.8)
SODIUM SERPL-SCNC: 139 MMOL/L (ref 136–145)
WBC # BLD AUTO: 5.53 10*3/MM3 (ref 3.4–10.8)

## 2021-09-08 PROCEDURE — 25010000002 PROPOFOL 200 MG/20ML EMULSION: Performed by: NURSE ANESTHETIST, CERTIFIED REGISTERED

## 2021-09-08 PROCEDURE — 96361 HYDRATE IV INFUSION ADD-ON: CPT

## 2021-09-08 PROCEDURE — G0378 HOSPITAL OBSERVATION PER HR: HCPCS

## 2021-09-08 PROCEDURE — 82365 CALCULUS SPECTROSCOPY: CPT | Performed by: UROLOGY

## 2021-09-08 PROCEDURE — C1758 CATHETER, URETERAL: HCPCS | Performed by: UROLOGY

## 2021-09-08 PROCEDURE — C1769 GUIDE WIRE: HCPCS | Performed by: UROLOGY

## 2021-09-08 PROCEDURE — C2617 STENT, NON-COR, TEM W/O DEL: HCPCS | Performed by: UROLOGY

## 2021-09-08 PROCEDURE — 25010000002 IOPAMIDOL 61 % SOLUTION: Performed by: UROLOGY

## 2021-09-08 PROCEDURE — 80048 BASIC METABOLIC PNL TOTAL CA: CPT | Performed by: EMERGENCY MEDICINE

## 2021-09-08 PROCEDURE — 52356 CYSTO/URETERO W/LITHOTRIPSY: CPT | Performed by: UROLOGY

## 2021-09-08 PROCEDURE — 25010000003 CEFAZOLIN SODIUM-DEXTROSE 2-3 GM-%(50ML) RECONSTITUTED SOLUTION: Performed by: UROLOGY

## 2021-09-08 PROCEDURE — C1894 INTRO/SHEATH, NON-LASER: HCPCS | Performed by: UROLOGY

## 2021-09-08 PROCEDURE — 85025 COMPLETE CBC W/AUTO DIFF WBC: CPT | Performed by: EMERGENCY MEDICINE

## 2021-09-08 PROCEDURE — 99204 OFFICE O/P NEW MOD 45 MIN: CPT | Performed by: UROLOGY

## 2021-09-08 PROCEDURE — 25010000002 FENTANYL CITRATE (PF) 100 MCG/2ML SOLUTION: Performed by: NURSE ANESTHETIST, CERTIFIED REGISTERED

## 2021-09-08 PROCEDURE — 99224 PR SBSQ OBSERVATION CARE/DAY 15 MINUTES: CPT | Performed by: EMERGENCY MEDICINE

## 2021-09-08 DEVICE — URETERAL STENT
Type: IMPLANTABLE DEVICE | Site: URETER | Status: FUNCTIONAL
Brand: CONTOUR™

## 2021-09-08 RX ORDER — PROPOFOL 10 MG/ML
INJECTION, EMULSION INTRAVENOUS AS NEEDED
Status: DISCONTINUED | OUTPATIENT
Start: 2021-09-08 | End: 2021-09-08 | Stop reason: SURG

## 2021-09-08 RX ORDER — PHENAZOPYRIDINE HYDROCHLORIDE 100 MG/1
100 TABLET, FILM COATED ORAL 3 TIMES DAILY PRN
Qty: 21 TABLET | Refills: 0 | Status: SHIPPED | OUTPATIENT
Start: 2021-09-08 | End: 2022-06-24

## 2021-09-08 RX ORDER — CIPROFLOXACIN 500 MG/1
500 TABLET, FILM COATED ORAL 2 TIMES DAILY
Qty: 6 TABLET | Refills: 0 | Status: SHIPPED | OUTPATIENT
Start: 2021-09-08 | End: 2022-06-24

## 2021-09-08 RX ORDER — OXYBUTYNIN CHLORIDE 10 MG/1
10 TABLET, EXTENDED RELEASE ORAL DAILY PRN
Qty: 10 TABLET | Refills: 0 | Status: SHIPPED | OUTPATIENT
Start: 2021-09-08 | End: 2022-06-24

## 2021-09-08 RX ORDER — FENTANYL CITRATE 50 UG/ML
INJECTION, SOLUTION INTRAMUSCULAR; INTRAVENOUS AS NEEDED
Status: DISCONTINUED | OUTPATIENT
Start: 2021-09-08 | End: 2021-09-08 | Stop reason: SURG

## 2021-09-08 RX ORDER — MAGNESIUM HYDROXIDE 1200 MG/15ML
LIQUID ORAL AS NEEDED
Status: DISCONTINUED | OUTPATIENT
Start: 2021-09-08 | End: 2021-09-08 | Stop reason: HOSPADM

## 2021-09-08 RX ORDER — LIDOCAINE HYDROCHLORIDE 20 MG/ML
INJECTION, SOLUTION INTRAVENOUS AS NEEDED
Status: DISCONTINUED | OUTPATIENT
Start: 2021-09-08 | End: 2021-09-08 | Stop reason: SURG

## 2021-09-08 RX ORDER — DOCUSATE SODIUM 100 MG/1
100 CAPSULE, LIQUID FILLED ORAL 2 TIMES DAILY
Qty: 15 CAPSULE | Refills: 1 | Status: SHIPPED | OUTPATIENT
Start: 2021-09-08 | End: 2022-06-24

## 2021-09-08 RX ORDER — ACETAMINOPHEN 500 MG
1000 TABLET ORAL EVERY 6 HOURS
Qty: 30 TABLET | Refills: 0 | Status: SHIPPED | OUTPATIENT
Start: 2021-09-08 | End: 2021-09-11

## 2021-09-08 RX ORDER — ATROPA BELLADONNA AND OPIUM 16.2; 3 MG/1; MG/1
SUPPOSITORY RECTAL AS NEEDED
Status: DISCONTINUED | OUTPATIENT
Start: 2021-09-08 | End: 2021-09-08 | Stop reason: HOSPADM

## 2021-09-08 RX ORDER — OXYCODONE HYDROCHLORIDE 5 MG/1
5 TABLET ORAL EVERY 6 HOURS PRN
Qty: 5 TABLET | Refills: 0 | Status: SHIPPED | OUTPATIENT
Start: 2021-09-08 | End: 2022-06-24

## 2021-09-08 RX ORDER — TAMSULOSIN HYDROCHLORIDE 0.4 MG/1
1 CAPSULE ORAL NIGHTLY
Qty: 10 CAPSULE | Refills: 0 | Status: SHIPPED | OUTPATIENT
Start: 2021-09-08 | End: 2022-06-24

## 2021-09-08 RX ADMIN — SODIUM CHLORIDE, POTASSIUM CHLORIDE, SODIUM LACTATE AND CALCIUM CHLORIDE 100 ML/HR: 600; 310; 30; 20 INJECTION, SOLUTION INTRAVENOUS at 14:05

## 2021-09-08 RX ADMIN — LIDOCAINE HYDROCHLORIDE 60 MG: 20 INJECTION, SOLUTION INTRAVENOUS at 16:00

## 2021-09-08 RX ADMIN — SODIUM CHLORIDE 100 ML/HR: 9 INJECTION, SOLUTION INTRAVENOUS at 03:09

## 2021-09-08 RX ADMIN — FENTANYL CITRATE 100 MCG: 50 INJECTION INTRAMUSCULAR; INTRAVENOUS at 15:57

## 2021-09-08 RX ADMIN — TAMSULOSIN HYDROCHLORIDE 0.4 MG: 0.4 CAPSULE ORAL at 08:15

## 2021-09-08 RX ADMIN — PROPOFOL 150 MG: 10 INJECTION, EMULSION INTRAVENOUS at 16:00

## 2021-09-08 RX ADMIN — CEFAZOLIN SODIUM 2 G: 2 SOLUTION INTRAVENOUS at 15:59

## 2021-09-08 NOTE — DISCHARGE SUMMARY
[facility]   DISCHARGE SUMMARY      Name:  Mihir Jha   Age:  56 y.o.  Sex:  male  :  1965  MRN:  [id number]   Visit Number:  [id number]  Primary Care Physician:  Kenia Kwok MD  Date of Discharge:  2021  Admission Date:  2021      Discharge Diagnosis:   Active Hospital Problems    Diagnosis  POA   • Kidney stone on left side [N20.0]  Yes      Resolved Hospital Problems   No resolved problems to display.         Presenting Problem/History of Present Illness:    Kidney stone on left side [N20.0]       Hospital Course:  56-year-old male with history of nephrolithiasis who presented with obstructing distal left ureteral stone 15 mm in size and intractable pain.  He was admitted by the hospitalists for overnight observation and underwent ureteroscopy and laser lithotripsy with stent insertion.  He tolerated procedure well.  His pain was well controlled at time of discharge.  He will remove his stent at home in 1 week and follow-up with me in 8 weeks with a renal ultrasound.    Procedures Performed:    Procedure(s):  URETEROSCOPY LEFT, CYSTOSCOPY LASER LITHOTRIPSY WITH STENT INSERTION ON LEFT, LEFT RETROGRADE PYELOGRAM       Consults:     Consults     No orders found for last 30 day(s).          Pertinent Test Results:     Lab Results (all)     Procedure Component Value Units Date/Time    STONE ANALYSIS - Calculus, Ureter, Left [[id number]] Collected: 21 1635    Specimen: Calculus from Ureter, Left Updated: 21 1703    Basic Metabolic Panel [[id number]]  (Abnormal) Collected: 21 0705    Specimen: Blood Updated: 21 0758     Glucose 106 mg/dL      BUN 22 mg/dL      Creatinine 1.03 mg/dL      Sodium 139 mmol/L      Potassium 4.2 mmol/L      Chloride 107 mmol/L      CO2 23.9 mmol/L      Calcium 8.8 mg/dL      eGFR Non African Amer 74 mL/min/1.73      BUN/Creatinine Ratio 21.4     Anion Gap 8.1 mmol/L     Narrative:      GFR Normal >60  Chronic Kidney  Disease <60  Kidney Failure <15      CBC Auto Differential [[id number]]  (Normal) Collected: 09/08/21 0705    Specimen: Blood Updated: 09/08/21 0736     WBC 5.53 10*3/mm3      RBC 4.59 10*6/mm3      Hemoglobin 14.1 g/dL      Hematocrit 41.9 %      MCV 91.3 fL      MCH 30.7 pg      MCHC 33.7 g/dL      RDW 13.2 %      RDW-SD 44.7 fl      MPV 9.4 fL      Platelets 169 10*3/mm3      Neutrophil % 62.6 %      Lymphocyte % 25.7 %      Monocyte % 9.0 %      Eosinophil % 1.8 %      Basophil % 0.5 %      Immature Grans % 0.4 %      Neutrophils, Absolute 3.46 10*3/mm3      Lymphocytes, Absolute 1.42 10*3/mm3      Monocytes, Absolute 0.50 10*3/mm3      Eosinophils, Absolute 0.10 10*3/mm3      Basophils, Absolute 0.03 10*3/mm3      Immature Grans, Absolute 0.02 10*3/mm3      nRBC 0.0 /100 WBC     COVID-19,[employer] IN-HOUSE,Nasal Swab No Transport Media 3-4 HR TAT - Swab, Nasal Cavity [[id number]]  (Normal) Collected: 09/07/21 1410    Specimen: Swab from Nasal Cavity Updated: 09/07/21 1445     COVID19 Not Detected    Narrative:      Fact sheet for providers: https://www.fda.gov/media/261971/download     Fact sheet for patients: https://www.fda.gov/media/077871/download    Test performed by PCR.    Consider negative results in combination with clinical observations, patient history, and epidemiological information.    [facility] Draw [[id number]] Collected: 09/07/21 1146    Specimen: Blood Updated: 09/07/21 1300    Narrative:      The following orders were created for panel order [facility] Draw.  Procedure                               Abnormality         Status                     ---------                               -----------         ------                     Green Top (Gel)[[id number]]                                  Final result               Lavender Top[[id number]]                                     Final result               Gold Top - SST[[id number]]                                   Final result               Light Blue  Top[[id number]]                                   Final result                 Please view results for these tests on the individual orders.    Lavender Top [[id number]] Collected: 09/07/21 1146    Specimen: Blood Updated: 09/07/21 1300     Extra Tube hold for add-on     Comment: Auto resulted       Gold Top - SST [[id number]] Collected: 09/07/21 1146    Specimen: Blood Updated: 09/07/21 1300     Extra Tube Hold for add-ons.     Comment: Auto resulted.       Green Top (Gel) [[id number]] Collected: 09/07/21 1146    Specimen: Blood Updated: 09/07/21 1300     Extra Tube Hold for add-ons.     Comment: Auto resulted.       Light Blue Top [[id number]] Collected: 09/07/21 1146    Specimen: Blood Updated: 09/07/21 1300     Extra Tube hold for add-on     Comment: Auto resulted       Urinalysis, Microscopic Only - Urine, Clean Catch [[id number]]  (Abnormal) Collected: 09/07/21 1208    Specimen: Urine, Clean Catch Updated: 09/07/21 1229     RBC, UA 6-12 /HPF      WBC, UA 0-2 /HPF      Bacteria, UA 1+ /HPF      Squamous Epithelial Cells, UA 0-2 /HPF      Hyaline Casts, UA None Seen /LPF      Methodology Manual Light Microscopy    Urinalysis With Culture If Indicated - Urine, Clean Catch [[id number]]  (Abnormal) Collected: 09/07/21 1208    Specimen: Urine, Clean Catch Updated: 09/07/21 1221     Color, UA Yellow     Appearance, UA Hazy     pH, UA 6.5     Specific Gravity, UA 1.019     Glucose, UA Negative     Ketones, UA Negative     Bilirubin, UA Negative     Blood, UA Small (1+)     Protein, UA >=300 mg/dL (3+)     Leuk Esterase, UA Negative     Nitrite, UA Negative     Urobilinogen, UA 0.2 E.U./dL    Comprehensive Metabolic Panel [[id number]]  (Abnormal) Collected: 09/07/21 1146    Specimen: Blood Updated: 09/07/21 1214     Glucose 126 mg/dL      BUN 27 mg/dL      Creatinine 1.13 mg/dL      Sodium 139 mmol/L      Potassium 4.0 mmol/L      Chloride 102 mmol/L      CO2 25.0 mmol/L      Calcium 10.2 mg/dL      Total Protein 7.1 g/dL       Albumin 4.80 g/dL      ALT (SGPT) 34 U/L      AST (SGOT) 23 U/L      Alkaline Phosphatase 77 U/L      Total Bilirubin 0.4 mg/dL      eGFR Non African Amer 67 mL/min/1.73      Globulin 2.3 gm/dL      A/G Ratio 2.1 g/dL      BUN/Creatinine Ratio 23.9     Anion Gap 12.0 mmol/L     Narrative:      GFR Normal >60  Chronic Kidney Disease <60  Kidney Failure <15      Lipase [[id number]]  (Normal) Collected: 09/07/21 1146    Specimen: Blood Updated: 09/07/21 1214     Lipase 32 U/L     CBC & Differential [[id number]]  (Abnormal) Collected: 09/07/21 1146    Specimen: Blood Updated: 09/07/21 1156    Narrative:      The following orders were created for panel order CBC & Differential.  Procedure                               Abnormality         Status                     ---------                               -----------         ------                     CBC Auto Differential[[id number]]        Abnormal            Final result                 Please view results for these tests on the individual orders.    CBC Auto Differential [[id number]]  (Abnormal) Collected: 09/07/21 1146    Specimen: Blood Updated: 09/07/21 1156     WBC 8.23 10*3/mm3      RBC 5.10 10*6/mm3      Hemoglobin 15.7 g/dL      Hematocrit 45.8 %      MCV 89.8 fL      MCH 30.8 pg      MCHC 34.3 g/dL      RDW 12.8 %      RDW-SD 42.2 fl      MPV 9.3 fL      Platelets 209 10*3/mm3      Neutrophil % 80.5 %      Lymphocyte % 12.5 %      Monocyte % 5.8 %      Eosinophil % 0.5 %      Basophil % 0.5 %      Immature Grans % 0.2 %      Neutrophils, Absolute 6.62 10*3/mm3      Lymphocytes, Absolute 1.03 10*3/mm3      Monocytes, Absolute 0.48 10*3/mm3      Eosinophils, Absolute 0.04 10*3/mm3      Basophils, Absolute 0.04 10*3/mm3      Immature Grans, Absolute 0.02 10*3/mm3      nRBC 0.0 /100 WBC           Imaging Results (All)     Procedure Component Value Units Date/Time    CT Abdomen Pelvis With Contrast [[id number]] Collected: 09/07/21 1302     Updated: 09/07/21 1307     "Narrative:      PROCEDURE: CT ABDOMEN PELVIS W CONTRAST-     HISTORY:  llq left groin pain     COMPARISON:  None .     TECHNIQUE: Multiple axial CT images were obtained from the lung bases  through the pubic symphysis following the administration of Isovue 300  contrast.      FINDINGS:      ABDOMEN: The lung bases are clear. The heart is normal in size. The  liver is normal. The spleen is unremarkable. No adrenal mass is present.   The pancreas is normal. There is moderate left hydronephrosis and  ureteral dilatation. A 12 mm stone is seen in the distal left ureter. A  cyst is seen in the superior pole of the right kidney. The aorta is  normal in caliber. There is no free fluid or adenopathy. The abdominal  portions of the GI tract are unremarkable with no evidence of  obstruction.     PELVIS: The appendix is not identified.  The urinary bladder is  unremarkable. There is no significant free fluid or adenopathy.       Impression:      12 mm stone in the distal left ureter producing moderate  hydronephrosis and ureteral dilatation.     375.88 mGy.cm        This study was performed with techniques to keep radiation doses as low  as reasonably achievable (ALARA). Individualized dose reduction  techniques using automated exposure control or adjustment of mA and/or  kV according to the patient size were employed.      This report was finalized on 9/7/2021 1:04 PM by Felix Clayton M.D..          Condition on Discharge:    Improved    Vital Signs:    BP (!) 141/106 (BP Location: Left arm, Patient Position: Lying)   Pulse 74   Temp 97.8 °F (36.6 °C) (Temporal)   Resp 14   Ht 177.8 cm (70\")   Wt 76.4 kg (168 lb 6.9 oz)   SpO2 100%   BMI 24.17 kg/m²     Discharge Disposition:    Home or Self Care    Discharge Medication:       Discharge Medications      New Medications      Instructions Start Date   acetaminophen 500 MG tablet  Commonly known as: TYLENOL   1,000 mg, Oral, Every 6 Hours      ciprofloxacin 500 MG " tablet  Commonly known as: Cipro   500 mg, Oral, 2 Times Daily      diclofenac 50 MG EC tablet  Commonly known as: VOLTAREN   50 mg, Oral, 2 Times Daily, For up to 1 week      docusate sodium 100 MG capsule  Commonly known as: Colace   Take 1 capsule by mouth 2 (Two) Times a Day. If taking oxycodone      oxybutynin XL 10 MG 24 hr tablet  Commonly known as: Ditropan XL   10 mg, Oral, Daily PRN      oxyCODONE 5 MG immediate release tablet  Commonly known as: Roxicodone   5 mg, Oral, Every 6 Hours PRN      phenazopyridine 100 MG tablet  Commonly known as: PYRIDIUM   100 mg, Oral, 3 Times Daily PRN      tamsulosin 0.4 MG capsule 24 hr capsule  Commonly known as: FLOMAX   0.4 mg, Oral, Nightly         Continue These Medications      Instructions Start Date   lisinopril 10 MG tablet  Commonly known as: PRINIVIL,ZESTRIL   10 mg, Oral, Daily, 0.5 TABLET DAILY              Discharge Diet:         Activity at Discharge:         Follow-up Appointments:    No future appointments.  Additional Instructions for the Follow-ups that You Need to Schedule     US Renal Bilateral    Nov 09, 2021 (Approximate)      Exam reason: nephrolithiasis, eval for hydronephrosis               Test Results Pending at Discharge:    Pending Labs     Order Current Status    STONE ANALYSIS - Calculus, Ureter, Left In process             Todd Prieto MD  09/08/21  17:16 EDT

## 2021-09-08 NOTE — CONSULTS
Reason for Consult  Chief Complaint   Patient presents with   • Abdominal Pain      Consulting Provider  Samir OLVERA  Mr. Jha is a 56 y.o. male with PMHx below who presents with 12 mm left distal ureteral stone.     He was admitted yesterday with colicky severe intermittent intractable left flank pain radiating to the groin, with no associated fevers, dysuria, gross hematuria.    Past Medical History  Past Medical History:   Diagnosis Date   • Arthritis     SHOULDERS   • Body piercing     LEFT EAR   • Colon polyps    • GERD (gastroesophageal reflux disease)    • Hard of hearing    • History of stomach ulcers    • Hypertension    • Kidney stones     DRINKING PEPSI   • No natural teeth    • Tinnitus    • Wears glasses     READING ONLY       Past Surgical History  Past Surgical History:   Procedure Laterality Date   • COLONOSCOPY     • COLONOSCOPY N/A 9/24/2018    Procedure: COLONOSCOPY WITH HOT BIOPSY POLYPECTOMY;  Surgeon: Xavi Lyman MD;  Location: [facility];  Service: Gastroenterology   • KIDNEY STONE SURGERY      X3   • KNEE ARTHROSCOPY Left     WITH REPAIR   • MULTIPLE TOOTH EXTRACTIONS         Medications    Current Facility-Administered Medications:   •  acetaminophen (TYLENOL) tablet 650 mg, 650 mg, Oral, Q4H PRN **OR** acetaminophen (TYLENOL) 160 MG/5ML solution 650 mg, 650 mg, Oral, Q4H PRN **OR** acetaminophen (TYLENOL) suppository 650 mg, 650 mg, Rectal, Q4H PRN, Osbaldo Dawson DO  •  ceFAZolin Sodium-Dextrose (ANCEF) IVPB (duplex) 2 g, 2 g, Intravenous, Once, Todd Prieto MD  •  enoxaparin (LOVENOX) syringe 40 mg, 40 mg, Subcutaneous, Q24H, Osbaldo Dawson DO, 40 mg at 09/07/21 1757  •  ketorolac (TORADOL) injection 30 mg, 30 mg, Intravenous, Q6H PRN, Osbaldo Dawson, , 30 mg at 09/07/21 1710  •  lactated ringers infusion, 100 mL/hr, Intravenous, Continuous, Todd Prieto MD  •  Morphine sulfate (PF) injection 2 mg, 2 mg, Intravenous, Q4H PRN, Osbaldo Dawson, , 2 mg at  21  •  ondansetron (ZOFRAN) injection 4 mg, 4 mg, Intravenous, Q6H PRN, Samir, Umar, DO  •  sodium chloride 0.9 % flush 10 mL, 10 mL, Intravenous, PRN, Samir, Umar, DO  •  sodium chloride 0.9 % flush 10 mL, 10 mL, Intravenous, Q12H, Samir, Umar, DO  •  sodium chloride 0.9 % flush 10 mL, 10 mL, Intravenous, PRN, Samir, Umar, DO  •  sodium chloride 0.9 % infusion, 100 mL/hr, Intravenous, Continuous, Samir, Umar, DO, Last Rate: 100 mL/hr at 21, 100 mL/hr at 21  •  tamsulosin (FLOMAX) 24 hr capsule 0.4 mg, 0.4 mg, Oral, Daily, Samir, Umar, DO, 0.4 mg at 21 1756    Allergies  Allergies   Allergen Reactions   • Sulfa Antibiotics Anaphylaxis     SULFA- ITSELF NOT JUST THE ANTIBIOTIC   • Tetanus Toxoids Other (See Comments)     LOCK JAW       Social History  Social History     Socioeconomic History   • Marital status:      Spouse name: Not on file   • Number of children: Not on file   • Years of education: Not on file   • Highest education level: Not on file   Tobacco Use   • Smoking status: Former Smoker     Types: Cigarettes     Quit date:      Years since quittin.6   • Smokeless tobacco: Never Used   Substance and Sexual Activity   • Alcohol use: No   • Drug use: Yes     Types: Marijuana     Comment: DAILY   • Sexual activity: Defer       Family History  History reviewed. No pertinent family history.    Review of Systems  Constitutional: No fevers  Skin: Negative for rash  Endocrine: No heat/cold intolerance   Cardiovascular: Negative for chest pain   Respiratory: Negative for shortness of breath or wheezing  Gastrointestinal: No constipation, nausea or vomiting  Genitourinary: Negative for new lower urinary tract symptoms, current gross hematuria or dysuria.  Musculoskeletal: Positive left flank pain  Neurological:  Negative for frequent headaches or dizziness  Lymph/Heme: Negative for leg swelling or calf pain.    Physical Exam  /80 (BP Location: Right  "arm, Patient Position: Lying)   Pulse 61   Temp 97.5 °F (36.4 °C) (Oral)   Resp 18   Ht 177.8 cm (70\")   Wt 76.4 kg (168 lb 6.9 oz)   SpO2 97%   BMI 24.17 kg/m²   Constitutional: NAD, WDWN.   HEENT: NCAT. Conjunctivae normal.  MMM.    Cardiovascular: Regular rate.  Pulmonary/Chest: Respirations are even and non-labored bilaterally.  Abdominal: Soft. No distension, tenderness, masses or guarding. No CVA tenderness.  Neurological: A + O. Cranial Nerves II-XII grossly intact.   Extremities: NASIR x 4, Warm. No clubbing.  No cyanosis.    Skin: Warm and dry.  No rashes noted.  Psychiatric:  Normal mood and affect      I/O last 3 completed shifts:  In: 2148 [P.O.:960; I.V.:1188]  Out: -     Labs  Lab Results   Component Value Date    GLUCOSE 106 (H) 09/08/2021    CALCIUM 8.8 09/08/2021     09/08/2021    K 4.2 09/08/2021    CO2 23.9 09/08/2021     09/08/2021    BUN 22 (H) 09/08/2021    CREATININE 1.03 09/08/2021    EGFRIFNONA 74 09/08/2021    BCR 21.4 09/08/2021    ANIONGAP 8.1 09/08/2021       Lab Results   Component Value Date    WBC 5.53 09/08/2021    HGB 14.1 09/08/2021    HCT 41.9 09/08/2021    MCV 91.3 09/08/2021     09/08/2021       Brief Urine Lab Results  (Last result in the past 365 days)      Color   Clarity   Blood   Leuk Est   Nitrite   Protein   CREAT   Urine HCG        09/07/21 1208 Yellow Hazy Small (1+) Negative Negative >=300 mg/dL (3+)               No results found for: URINECX    Radiographic Studies  CT Abdomen Pelvis With Contrast    Result Date: 9/7/2021  PROCEDURE: CT ABDOMEN PELVIS W CONTRAST-  HISTORY:  llq left groin pain  COMPARISON:  None .  TECHNIQUE: Multiple axial CT images were obtained from the lung bases through the pubic symphysis following the administration of Isovue 300 contrast.  FINDINGS:  ABDOMEN: The lung bases are clear. The heart is normal in size. The liver is normal. The spleen is unremarkable. No adrenal mass is present.  The pancreas is normal. There " is moderate left hydronephrosis and ureteral dilatation. A 12 mm stone is seen in the distal left ureter. A cyst is seen in the superior pole of the right kidney. The aorta is normal in caliber. There is no free fluid or adenopathy. The abdominal portions of the GI tract are unremarkable with no evidence of obstruction.  PELVIS: The appendix is not identified.  The urinary bladder is unremarkable. There is no significant free fluid or adenopathy.      12 mm stone in the distal left ureter producing moderate hydronephrosis and ureteral dilatation.  375.88 mGy.cm   This study was performed with techniques to keep radiation doses as low as reasonably achievable (ALARA). Individualized dose reduction techniques using automated exposure control or adjustment of mA and/or kV according to the patient size were employed.  This report was finalized on 9/7/2021 1:04 PM by Felix Clayton M.D..      I have personally reviewed these labs and imaging.     Assessment  Mr. Jha is a 56 y.o. male with a PMHx of nephrolithiasis who presents with 12 mm left distal ureteral stone.    Plan  1.  OR today for left ureteroscopy laser lithotripsy with stent insertion

## 2021-09-08 NOTE — ANESTHESIA PREPROCEDURE EVALUATION
Anesthesia Evaluation     Patient summary reviewed and Nursing notes reviewed                Airway   Mallampati: II  TM distance: >3 FB  Neck ROM: full  No difficulty expected  Dental - normal exam   (+) edentulous    Pulmonary - normal exam   (+) a smoker Current Abstained day of surgery,   Cardiovascular - normal exam  Exercise tolerance: good (4-7 METS)    (+) hypertension,       Neuro/Psych- negative ROS  GI/Hepatic/Renal/Endo    (+)  GERD,  renal disease stones,     Musculoskeletal     Abdominal  - normal exam    Bowel sounds: normal.   Substance History - negative use     OB/GYN negative ob/gyn ROS         Other   arthritis,                        Anesthesia Plan    ASA 2     general     intravenous induction     Anesthetic plan, all risks, benefits, and alternatives have been provided, discussed and informed consent has been obtained with: patient.

## 2021-09-08 NOTE — PROGRESS NOTES
"I have seen and evaluated the patient this morning.  He felt fine, had improving abdominal pain.  He was due to go to the OR and is being discharged from their home following cystoscopy and laser lithotripsy.  He will follow up with urology outpatient.    /80 (BP Location: Left arm, Patient Position: Lying)   Pulse 72   Temp 97.8 °F (36.6 °C) (Temporal)   Resp 14   Ht 177.8 cm (70\")   Wt 76.4 kg (168 lb 6.9 oz)   SpO2 95%   BMI 24.17 kg/m²     General: NAD, resting in bed  HEENT: EOMI, NC/AT  Heart: regular  Lungs: nonlabored  Abdomen: Soft, nondistended, nontender  Extremities: No edema  Neurological: A&O x3, moves all extremities  Psychological: Mood and affect appropriate, speech is coherent  Skin: warm, dry    I have reviewed all the lab results.    A/P  1.  L-sided obstructive uropathy with moderate hydronephrosis    DC instructions and summary per Dr. Prieto  "

## 2021-09-08 NOTE — ANESTHESIA PROCEDURE NOTES
Airway  Urgency: elective    Date/Time: 9/8/2021 3:59 PM  Airway not difficult    General Information and Staff    Patient location during procedure: OR  CRNA: Art Gastelum CRNA    Indications and Patient Condition  Indications for airway management: CNS depression    Preoxygenated: yes  Mask difficulty assessment: 1 - vent by mask    Final Airway Details  Final airway type: supraglottic airway      Successful airway: classic  Size 4    Number of attempts at approach: 1

## 2021-09-08 NOTE — PLAN OF CARE
Goal Outcome Evaluation:  Plan of Care Reviewed With: patient        Progress: no change  Outcome Summary: Rested well, VSS, pain controlled with PRN meds, awaiting possible surgery today

## 2021-09-08 NOTE — PLAN OF CARE
Goal Outcome Evaluation:  Plan of Care Reviewed With: patient        Progress: no change  Outcome Summary: VSS.  No c/o pain.  Patient is resting in bed.  Up ad bryan.

## 2021-09-08 NOTE — OP NOTE
Preoperative diagnosis  left ureteral stone    Postoperative diagnosis  left ureteral stone    Procedure performed  1.  Flexible cystoscopy  2.  left retrograde pyelogram  3.  left ureteroscopy with holmium-YAG laser lithotripsy and basket extraction of stone fragments (46606)  4.  left ureteral stent placement 6 Fr x 28 cm  5.  Fluoroscopy time < 1 hour    Surgeon  Todd Prieto MD    Anesthesia  General    Complications  None    Specimen  Stone fragments for biochemical analysis    Findings  Cystoscopy revealed no tumors, stones or other mucosal abnormalities.  Retrograde pyelogram revealed a delicate system with identification of the stones seen on imaging.    Ureteroscopy revealed a large distal left ureteral stone compatible with preoperative imaging.     Indications  56 y.o. male with a history of obstructing left distal ureteral stone and intractable pain agreed to undergo the above named procedure after discussion of the alternatives, risks and benefits. Informed consent was obtained.      Procedure  The patient was taken to the operating room and placed supine on the operating table.  Pre-operative antibiotics were administered.  Bilateral lower extremity SCDs were placed.  After induction of general anesthesia the patient was positioned in dorsal lithotomy, prepped and draped in a sterile fashion.  A time-out was performed.      A 14 Indonesian flexible cystoscope was passed carefully via urethra into the bladder.  The left ureteral orifice was identified and a Sensor wire was passed retrograde to the level of the kidney and confirmed by fluoroscopy.  The flexible scope was off-loaded and the bladder emptied with a straight catheter.  An 8-10 coaxial dilator was passed without difficulty and then removed.  The semirigid ureteroscope was passed carefully along the Sensor wire through the urethra and into the distal ureter.  The stone was encountered and fragmented with a 200-micron holmium YAG laser fiber at  laser settings of 0.2 Joules and a frequency of 50 Hz.  The fragments were basket extracted.  At the completion of the procedure, all clinically significant fragments were removed from the ureter and only dust-like fragments remained.  The ureteroscope was withdrawn.  A 5-Sammarinese open-ended was passed over the wire and the wire removed.  A retrograde pyelogram was performed by slowly injecting 5 mL of 50% Omnipaque contrast via the 5 Sammarinese catheter with findings described above.  An Amplatz super-stiff was placed to the level of the renal pelvis confirmed by fluoroscopy.  A 6 Fr x 28 cm stent was positioned with the upper end in the upper pole and the lower in the bladder confirmed by fluoroscopy. The bladder was emptied and the procedure was complete. The patient tolerated the procedure well and was stable throughout.    The patient will remove his stent at home next week and follow-up with me in 8 weeks with a renal ultrasound.

## 2021-09-08 NOTE — CASE MANAGEMENT/SOCIAL WORK
Discharge Planning Assessment  [facility]     Patient Name: Mihir Jha  MRN: [id number]  Today's Date: 9/8/2021    Admit Date: 9/7/2021    Discharge Needs Assessment     Row Name 09/08/21 1200       Living Environment    Lives With  spouse    Current Living Arrangements  home/apartment/condo mobile home    Primary Care Provided by  self    Provides Primary Care For  no one, unable/limited ability to care for self    Family Caregiver if Needed  none    Quality of Family Relationships  supportive    Able to Return to Prior Arrangements  yes    Living Arrangement Comments  Lives in a mobile home with his wife       Resource/Environmental Concerns    Resource/Environmental Concerns  none    Transportation Concerns  car, none       Transition Planning    Patient/Family Anticipates Transition to  home    Patient/Family Anticipated Services at Transition  none    Transportation Anticipated  family or friend will provide       Discharge Needs Assessment    Readmission Within the Last 30 Days  no previous admission in last 30 days    Equipment Currently Used at Home  none    Concerns to be Addressed  no discharge needs identified    Anticipated Changes Related to Illness  none    Equipment Needed After Discharge  none    Provided Post Acute Provider List?  N/A    N/A Provider List Comment  No needs    Provided Post Acute Provider Quality & Resource List?  N/A    N/A Quality & Resource List Comment  No needs        Discharge Plan     Row Name 09/08/21 1207       Plan    Plan Spoke to pt and wife in room.  Has no POA or LW.  Address and phone no correct.  Lives with his wife in a mobile home.  Has steps but has no problem with them.  Independent with [employer], has no medical equipment.  PCP and Pharmacy verified.  Wife will transport home.  Denies DC needs.        Continued Care and Services - Admitted Since 9/7/2021    Coordination has not been started for this encounter.         Demographic Summary     Row Name  09/08/21 1155       General Information    Admission Type  observation    Arrived From  emergency department    Expected Length of Stay (LOS)  1 day    Referral Source  admission list    Reason for Consult  discharge planning    Preferred Language  English     Used During This Interaction  no        Functional Status     Row Name 09/08/21 1159       Functional Status    Usual Activity Tolerance  good    Current Activity Tolerance  good    Functional Status Comments  independent       Functional Status, IADL    Medications  independent    Meal Preparation  independent    Housekeeping  independent    Laundry  independent    Shopping  independent    IADL Comments  self       Mental Status    General Appearance WDL  WDL       Mental Status Summary    Recent Changes in Mental Status/Cognitive Functioning  no changes    Mental Status Comments  alert and oriented                Alysha Kothari RN

## 2021-09-08 NOTE — ANESTHESIA POSTPROCEDURE EVALUATION
Patient: Mihir Jha    Procedure Summary     Date: 09/08/21 Room / Location: [facility] /  [facility] OR    Anesthesia Start: 1559 Anesthesia Stop: 1710    Procedure: URETEROSCOPY LEFT, CYSTOSCOPY LASER LITHOTRIPSY WITH STENT INSERTION ON LEFT, LEFT RETROGRADE PYELOGRAM (Left ) Diagnosis:       Kidney stone on left side      (Kidney stone on left side [N20.0])    Surgeons: Todd Prieto MD Provider: Art Gastelum CRNA    Anesthesia Type: general ASA Status: 2          Anesthesia Type: general    Vitals  Vitals Value Taken Time   /106 09/08/21 1708   Temp     Pulse 72 09/08/21 1710   Resp     SpO2 100 % 09/08/21 1710   Vitals shown include unvalidated device data.        Post Anesthesia Care and Evaluation    Patient location during evaluation: PACU  Patient participation: complete - patient participated  Level of consciousness: awake and alert and sleepy but conscious  Pain score: 2  Pain management: adequate  Airway patency: patent  Anesthetic complications: No anesthetic complications  PONV Status: none  Cardiovascular status: acceptable  Respiratory status: acceptable and face mask  Hydration status: acceptable

## 2021-09-08 NOTE — DISCHARGE INSTRUCTIONS
Home Care After Ureteroscopy and Laser Lithotripsy  The following instructions will help you care for yourself, or be cared for upon your return home today.    These are guidelines for your care right after surgery only.     Diet  Drink plenty of liquids and eat light meals today.    Start your regular diet tomorrow.    Activity  Start normal activities in twenty-four (24) hours.    Wound Care and Hygiene  No restrictions, start normal routine.    Anesthesia Precautions & Expectations  After anesthesia, rest for 24 hours.    Do not drive, drink alcoholic beverages or make any important decisions during this time.  General anesthesia may cause a sore throat, jaw discomfort or muscle aches.    These symptoms can last for one or two days.     What to Expect after Surgery  Mild pain with voiding.  Frequency or urgency.  Bladder cramps.  Minimal bleeding with voiding.    Call your Doctor  Passing clots in urine preventing bladder emptying  Severe pain not controlled by oral medication  Temperature above 101.5 degrees  Inability to urinate within eight (8) hours after surgery    After Stent Placement  It is common to have blood tinged urine for 3-5 days.  It is common to have pain in your side and in your back when you urinate for 3-5 days.  It is common to have urgency with urination.  This is a temporary stent and you will remove at home in 1 week from your surgery.     Other Contacts  Urology Office:  [address]   [place] [id number] [phone] office  [phone] fax    Other Instructions  Take tamsulosin daily until the stent comes out.  Take oxybutynin daily as needed for bladder spasm while the stent is in.  Take Pyridium up to 3 times daily as needed for burning with urination.   Take Tylenol scheduled every 6 hours for the first 3 days.  Take the oxycodone on top of that as needed.  Take all of the antibiotics.     To remove the stent, take off the wrapping, grasp the string, and pull  until you see both curly ends of the blue plastic stent come out.  Take some pain medication before you do this.  Do this in the morning so that you can call the office if there are issues.    Follow up Appointment  Please call Dr. Prieto's office to schedule follow-up appointment in 8 weeks with kidney ultrasound beforehand.

## 2021-09-09 NOTE — CASE MANAGEMENT/SOCIAL WORK
Case Management Discharge Note           Provided Post Acute Provider List?: N/A  N/A Provider List Comment: No needs  Provided Post Acute Provider Quality & Resource List?: N/A  N/A Quality & Resource List Comment: No needs        [facility]    No services have been selected for the patient.                  Transportation Services  Private: Car    Final Discharge Disposition Code: 01 - home or self-care

## 2021-09-09 NOTE — CASE MANAGEMENT/SOCIAL WORK
Case Management Discharge Note           Provided Post Acute Provider List?: N/A  N/A Provider List Comment: No needs  Provided Post Acute Provider Quality & Resource List?: N/A  N/A Quality & Resource List Comment: No needs        Dialysis/Infusion Coordination complete.    Service Provider Selected Services Address Phone Fax Patient Preferred    [facility] [place]  D[facility] [address], [place] [address] [phone] -- --       Internal Comment last updated by Alysha Kothari, RN 9/9/2021 0724    Scheduled at [place] due to Covid positive                             Transportation Services  Private: Car    Final Discharge Disposition Code: 01 - home or self-care

## 2021-09-14 LAB
CALCIUM OXALATE DIHYDRATE MFR STONE IR: 40 %
COLOR STONE: NORMAL
COM MFR STONE: 50 %
COMPN STONE: NORMAL
HYDROXYAPATITE 24H ENGDIFF UR: 10 %
LABORATORY COMMENT REPORT: NORMAL
Lab: NORMAL
Lab: NORMAL
PHOTO: NORMAL
SIZE STONE: NORMAL MM
SPEC SOURCE SUBJ: NORMAL
WT STONE: 9 MG

## 2021-09-16 ENCOUNTER — OFFICE VISIT (OUTPATIENT)
Dept: UROLOGY | Facility: CLINIC | Age: 56
End: 2021-09-16

## 2021-09-16 VITALS
OXYGEN SATURATION: 97 % | TEMPERATURE: 97.1 F | WEIGHT: 168 LBS | HEART RATE: 69 BPM | RESPIRATION RATE: 18 BRPM | HEIGHT: 70 IN | BODY MASS INDEX: 24.05 KG/M2 | DIASTOLIC BLOOD PRESSURE: 74 MMHG | SYSTOLIC BLOOD PRESSURE: 133 MMHG

## 2021-09-16 DIAGNOSIS — N20.0 NEPHROLITHIASIS: Primary | ICD-10-CM

## 2021-09-16 PROCEDURE — 52310 CYSTOSCOPY AND TREATMENT: CPT | Performed by: UROLOGY

## 2021-09-16 RX ORDER — LISINOPRIL AND HYDROCHLOROTHIAZIDE 12.5; 1 MG/1; MG/1
TABLET ORAL
COMMUNITY
Start: 2021-09-07

## 2021-09-16 NOTE — PROGRESS NOTES
Preoperative diagnosis  Foreign body in genitourinary tract    Postoperative diagnosis  Foreign body in genitourinary tract    Procedure  Flexible cystourethroscopy with stent removal    Attending surgeon  Todd Prieto MD    Anesthesia  2% lidocaine jelly intraurethrally    Complications  None    Indications  56 y.o. male who is status post left ureteroscopy with holmium laser lithotripsy who presents for stent removal.    Procedure  Detailed information of all possible complications and side effects were discussed with the patient.  Informed consent was obtained. Patient was given one dose of antibiotics. The patient was placed in supine position and a timeout was performed. The patient was prepped and draped in sterile fashion.  Next, 2% lidocaine jelly was bluntly injected per urethra without difficulty. The 14 Maltese flexible cystoscope was passed through the urethra and into the bladder.  The stent was visualized, grasped and removed in its entirety.  The patient tolerated the procedure well.    Plan  1. Provided education regarding water intake of at least 2 liters per day  2. F/u in 8 weeks with a renal ultrasound

## 2021-11-02 ENCOUNTER — APPOINTMENT (OUTPATIENT)
Dept: ULTRASOUND IMAGING | Facility: HOSPITAL | Age: 56
End: 2021-11-02

## 2021-12-02 ENCOUNTER — APPOINTMENT (OUTPATIENT)
Dept: ULTRASOUND IMAGING | Facility: HOSPITAL | Age: 56
End: 2021-12-02

## 2021-12-02 ENCOUNTER — HOSPITAL ENCOUNTER (OUTPATIENT)
Dept: ULTRASOUND IMAGING | Facility: HOSPITAL | Age: 56
Discharge: HOME | End: 2021-12-02
Admitting: UROLOGY

## 2021-12-02 DIAGNOSIS — N20.0 KIDNEY STONE ON LEFT SIDE: ICD-10-CM

## 2021-12-02 PROCEDURE — 76775 US EXAM ABDO BACK WALL LIM: CPT

## 2021-12-06 ENCOUNTER — OFFICE VISIT (OUTPATIENT)
Dept: UROLOGY | Facility: CLINIC | Age: 56
End: 2021-12-06

## 2021-12-06 VITALS
DIASTOLIC BLOOD PRESSURE: 92 MMHG | OXYGEN SATURATION: 98 % | HEIGHT: 70 IN | SYSTOLIC BLOOD PRESSURE: 138 MMHG | WEIGHT: 168 LBS | BODY MASS INDEX: 24.05 KG/M2 | HEART RATE: 70 BPM | TEMPERATURE: 97.9 F

## 2021-12-06 DIAGNOSIS — N20.0 KIDNEY STONE ON LEFT SIDE: Primary | ICD-10-CM

## 2021-12-06 PROCEDURE — 99213 OFFICE O/P EST LOW 20 MIN: CPT | Performed by: UROLOGY

## 2021-12-06 NOTE — PROGRESS NOTES
Chief Complaint   Patient presents with   • Follow-up     Follow up for renal ultrasound results        HPI  Ms. Jha is a 56 y.o. male with history below in assessment, who presents for follow up.     At this visit no flank pain. Previously drank lots of sprite. Ate lots of salt.     Past Medical History:   Diagnosis Date   • Arthritis     SHOULDERS   • Body piercing     LEFT EAR   • Bursitis     Jeremy shoulders   • Colon polyps    • GERD (gastroesophageal reflux disease)    • Hard of hearing    • History of stomach ulcers    • Hypertension    • Kidney stones     DRINKING PEPSI   • No natural teeth    • Tendonitis     JEREMY shoulders   • Tinnitus    • Wears glasses     READING ONLY       Past Surgical History:   Procedure Laterality Date   • COLONOSCOPY     • COLONOSCOPY N/A 9/24/2018    Procedure: COLONOSCOPY WITH HOT BIOPSY POLYPECTOMY;  Surgeon: Xavi Lyman MD;  Location: [facility] ENDOSCOPY;  Service: Gastroenterology   • INGUINAL HERNIA REPAIR Left    • KIDNEY STONE SURGERY      X3   • KNEE ARTHROSCOPY Left     WITH REPAIR   • MULTIPLE TOOTH EXTRACTIONS     • URETEROSCOPY LASER LITHOTRIPSY WITH STENT INSERTION Left 9/8/2021    Procedure: URETEROSCOPY LEFT, CYSTOSCOPY LASER LITHOTRIPSY WITH STENT INSERTION ON LEFT, LEFT RETROGRADE PYELOGRAM;  Surgeon: Todd Prieto MD;  Location: [facility] OR;  Service: Urology;  Laterality: Left;         Current Outpatient Medications:   •  ciprofloxacin (Cipro) 500 MG tablet, Take 1 tablet by mouth 2 (Two) Times a Day., Disp: 6 tablet, Rfl: 0  •  diclofenac (VOLTAREN) 50 MG EC tablet, Take 1 tablet by mouth 2 (Two) Times a Day. For up to 1 week, Disp: 30 tablet, Rfl: 0  •  docusate sodium (Colace) 100 MG capsule, Take 1 capsule by mouth 2 (Two) Times a Day. If taking oxycodone, Disp: 15 capsule, Rfl: 1  •  lisinopril (PRINIVIL,ZESTRIL) 10 MG tablet, Take 10 mg by mouth Daily. 0.5 TABLET DAILY, Disp: , Rfl:   •  lisinopril-hydrochlorothiazide (PRINZIDE,ZESTORETIC) 10-12.5  "MG per tablet, , Disp: , Rfl:   •  oxybutynin XL (Ditropan XL) 10 MG 24 hr tablet, Take 1 tablet by mouth Daily As Needed for bladder spasm, Disp: 10 tablet, Rfl: 0  •  oxyCODONE (Roxicodone) 5 MG immediate release tablet, Take 1 tablet by mouth Every 6 (Six) Hours As Needed for Moderate Pain or Severe Pain ., Disp: 5 tablet, Rfl: 0  •  phenazopyridine (PYRIDIUM) 100 MG tablet, Take 1 tablet by mouth 3 (Three) Times a Day As Needed for urinary burning, Disp: 21 tablet, Rfl: 0  •  tamsulosin (FLOMAX) 0.4 MG capsule 24 hr capsule, Take 1 capsule by mouth Every Night., Disp: 10 capsule, Rfl: 0     Physical Exam  Visit Vitals  /92   Pulse 70   Temp 97.9 °F (36.6 °C)   Ht 177.8 cm (70\")   Wt 76.2 kg (168 lb)   SpO2 98%   BMI 24.11 kg/m²       Labs  Brief Urine Lab Results  (Last result in the past 365 days)      Color   Clarity   Blood   Leuk Est   Nitrite   Protein   CREAT   Urine HCG        09/07/21 1208 Yellow   Hazy   Small (1+)   Negative   Negative   >=300 mg/dL (3+)                 Lab Results   Component Value Date    GLUCOSE 106 (H) 09/08/2021    CALCIUM 8.8 09/08/2021     09/08/2021    K 4.2 09/08/2021    CO2 23.9 09/08/2021     09/08/2021    BUN 22 (H) 09/08/2021    CREATININE 1.03 09/08/2021    EGFRIFNONA 74 09/08/2021    BCR 21.4 09/08/2021    ANIONGAP 8.1 09/08/2021       Lab Results   Component Value Date    WBC 5.53 09/08/2021    HGB 14.1 09/08/2021    HCT 41.9 09/08/2021    MCV 91.3 09/08/2021     09/08/2021            No results found for: PSA          Radiographic Studies  CT Abdomen Pelvis With Contrast  Result Date: 9/7/2021  12 mm stone in the distal left ureter producing moderate hydronephrosis and ureteral dilatation.  375.88 mGy.cm   This study was performed with techniques to keep radiation doses as low as reasonably achievable (ALARA). Individualized dose reduction techniques using automated exposure control or adjustment of mA and/or kV according to the patient size " were employed.  This report was finalized on 9/7/2021 1:04 PM by Felix Clayton M.D..    US Renal Bilateral  Result Date: 12/2/2021  No evidence of hydronephrosis.  This report was finalized on 12/2/2021 2:52 PM by Felix Clayton M.D..    Results for LUIS F RODRIGUEZ (MRN [id number]) as of 12/6/2021 15:37   Ref. Range 9/8/2021 16:35   Ca Oxalate-Dihydrate, Stone Latest Units: % 40   Ca Oxalate - Monohydrate, Stone Latest Units: % 50       I have reviewed above labs and imaging.     Assessment  56 y.o. male with nephrolithiasis, 12 mm distal left ureteral stone, status post left ureteroscopy laser lithotripsy 9/8/2021.  Stone analysis calcium oxalate.  Follow-up renal ultrasound normal.    Plan  1. DASH diet;   2. FU in 1 yr w/ KUB

## 2022-06-23 ENCOUNTER — HOSPITAL ENCOUNTER (OUTPATIENT)
Dept: GENERAL RADIOLOGY | Facility: HOSPITAL | Age: 57
Discharge: HOME | End: 2022-06-23
Admitting: UROLOGY

## 2022-06-23 DIAGNOSIS — N20.0 KIDNEY STONE ON LEFT SIDE: ICD-10-CM

## 2022-06-23 PROCEDURE — 74018 RADEX ABDOMEN 1 VIEW: CPT

## 2022-06-24 ENCOUNTER — OFFICE VISIT (OUTPATIENT)
Dept: UROLOGY | Facility: CLINIC | Age: 57
End: 2022-06-24

## 2022-06-24 VITALS
OXYGEN SATURATION: 99 % | HEART RATE: 68 BPM | DIASTOLIC BLOOD PRESSURE: 80 MMHG | TEMPERATURE: 97.8 F | SYSTOLIC BLOOD PRESSURE: 130 MMHG | BODY MASS INDEX: 24.05 KG/M2 | HEIGHT: 70 IN | WEIGHT: 168 LBS

## 2022-06-24 DIAGNOSIS — N20.0 KIDNEY STONE ON LEFT SIDE: Primary | ICD-10-CM

## 2022-06-24 LAB
BILIRUB BLD-MCNC: NEGATIVE MG/DL
CLARITY, POC: CLEAR
COLOR UR: YELLOW
EXPIRATION DATE: NORMAL
GLUCOSE UR STRIP-MCNC: NEGATIVE MG/DL
KETONES UR QL: NEGATIVE
LEUKOCYTE EST, POC: NEGATIVE
Lab: NORMAL
NITRITE UR-MCNC: NEGATIVE MG/ML
PH UR: 7 [PH] (ref 5–8)
PROT UR STRIP-MCNC: NEGATIVE MG/DL
RBC # UR STRIP: NEGATIVE /UL
SP GR UR: 1.01 (ref 1–1.03)
UROBILINOGEN UR QL: NORMAL

## 2022-06-24 PROCEDURE — 81003 URINALYSIS AUTO W/O SCOPE: CPT | Performed by: UROLOGY

## 2022-06-24 PROCEDURE — 99214 OFFICE O/P EST MOD 30 MIN: CPT | Performed by: UROLOGY

## 2022-06-24 RX ORDER — POTASSIUM CITRATE 15 MEQ/1
15 TABLET, EXTENDED RELEASE ORAL 2 TIMES DAILY WITH MEALS
Qty: 60 TABLET | Refills: 11 | Status: SHIPPED | OUTPATIENT
Start: 2022-06-24 | End: 2023-01-05 | Stop reason: SDUPTHER

## 2022-06-24 NOTE — PROGRESS NOTES
Chief Complaint   Patient presents with   • Follow-up     Er follow up stones      HPI  Ms. Jha is a 57 y.o. male with history below in assessment, who presents for follow up.     At this visit patient is not having any flank pain.  He says he recently passed a large amount of stones and has brought them in.    Past Medical History:   Diagnosis Date   • Arthritis     SHOULDERS   • Body piercing     LEFT EAR   • Bursitis     Jeremy shoulders   • Colon polyps    • GERD (gastroesophageal reflux disease)    • Hard of hearing    • History of stomach ulcers    • Hypertension    • Kidney stones     DRINKING PEPSI   • No natural teeth    • Tendonitis     JEREMY shoulders   • Tinnitus    • Wears glasses     READING ONLY       Past Surgical History:   Procedure Laterality Date   • COLONOSCOPY     • COLONOSCOPY N/A 9/24/2018    Procedure: COLONOSCOPY WITH HOT BIOPSY POLYPECTOMY;  Surgeon: Xavi Lyman MD;  Location: [facility] ENDOSCOPY;  Service: Gastroenterology   • INGUINAL HERNIA REPAIR Left    • KIDNEY STONE SURGERY      X3   • KNEE ARTHROSCOPY Left     WITH REPAIR   • MULTIPLE TOOTH EXTRACTIONS     • URETEROSCOPY LASER LITHOTRIPSY WITH STENT INSERTION Left 9/8/2021    Procedure: URETEROSCOPY LEFT, CYSTOSCOPY LASER LITHOTRIPSY WITH STENT INSERTION ON LEFT, LEFT RETROGRADE PYELOGRAM;  Surgeon: Todd Prieto MD;  Location: [facility] OR;  Service: Urology;  Laterality: Left;         Current Outpatient Medications:   •  lisinopril (PRINIVIL,ZESTRIL) 10 MG tablet, Take 10 mg by mouth Daily. 0.5 TABLET DAILY, Disp: , Rfl:   •  ciprofloxacin (Cipro) 500 MG tablet, Take 1 tablet by mouth 2 (Two) Times a Day., Disp: 6 tablet, Rfl: 0  •  diclofenac (VOLTAREN) 50 MG EC tablet, Take 1 tablet by mouth 2 (Two) Times a Day. For up to 1 week, Disp: 30 tablet, Rfl: 0  •  docusate sodium (Colace) 100 MG capsule, Take 1 capsule by mouth 2 (Two) Times a Day. If taking oxycodone, Disp: 15 capsule, Rfl: 1  •  lisinopril-hydrochlorothiazide  "(PRINZIDE,ZESTORETIC) 10-12.5 MG per tablet, , Disp: , Rfl:   •  oxybutynin XL (Ditropan XL) 10 MG 24 hr tablet, Take 1 tablet by mouth Daily As Needed for bladder spasm, Disp: 10 tablet, Rfl: 0  •  oxyCODONE (Roxicodone) 5 MG immediate release tablet, Take 1 tablet by mouth Every 6 (Six) Hours As Needed for Moderate Pain or Severe Pain ., Disp: 5 tablet, Rfl: 0  •  phenazopyridine (PYRIDIUM) 100 MG tablet, Take 1 tablet by mouth 3 (Three) Times a Day As Needed for urinary burning, Disp: 21 tablet, Rfl: 0  •  tamsulosin (FLOMAX) 0.4 MG capsule 24 hr capsule, Take 1 capsule by mouth Every Night., Disp: 10 capsule, Rfl: 0     Physical Exam  Visit Vitals  /80   Pulse 68   Temp 97.8 °F (36.6 °C) (Temporal)   Ht 177.8 cm (70\")   Wt 76.2 kg (168 lb)   SpO2 99%   BMI 24.11 kg/m²       Labs  Brief Urine Lab Results  (Last result in the past 365 days)      Color   Clarity   Blood   Leuk Est   Nitrite   Protein   CREAT   Urine HCG        06/24/22 0908 Yellow   Clear   Negative   Negative   Negative   Negative                 Lab Results   Component Value Date    GLUCOSE 106 (H) 09/08/2021    CALCIUM 8.8 09/08/2021     09/08/2021    K 4.2 09/08/2021    CO2 23.9 09/08/2021     09/08/2021    BUN 22 (H) 09/08/2021    CREATININE 1.03 09/08/2021    EGFRIFNONA 74 09/08/2021    BCR 21.4 09/08/2021    ANIONGAP 8.1 09/08/2021       Lab Results   Component Value Date    WBC 5.53 09/08/2021    HGB 14.1 09/08/2021    HCT 41.9 09/08/2021    MCV 91.3 09/08/2021     09/08/2021      Latest Reference Range & Units 09/08/21 16:35   Ca Oxalate-Dihydrate, Stone % 40   Ca Oxalate - Monohydrate, Stone % 50          No results found for: PSA          Radiographic Studies  XR Abdomen KUB  Result Date: 6/23/2022  Solitary left lower pole renal stone measuring 8 mm.  This report was signed and finalized on 6/23/2022 4:12 PM by Tha Castillo MD.      I have reviewed above labs and imaging.     Assessment  57 y.o. male with " recurrent renal stones. S/p L URS LL in 2021. New 8mm LLP renal stone. He recently has passed many large stones, several may be close to 10mm.  Unclear if these are from his bladder from his kidney.  There is exacerbation of chronic disease.    Plan  1. Lemon juice 1 cup daily.   2. Donohue K citrate 15 mEq twice daily.  Follow-up in 6 months with CT

## 2022-06-27 ENCOUNTER — TELEPHONE (OUTPATIENT)
Dept: UROLOGY | Facility: CLINIC | Age: 57
End: 2022-06-27

## 2022-06-27 NOTE — TELEPHONE ENCOUNTER
Provider: DR. LEIZONDO   Caller: SHUBHAM RODRIGUEZ   Relationship to Patient: SPOUSE     Phone Number: [phone]    Reason for Call: PATIENT'S SPOUSE WOULD LIKE TO SCHEDULE SURGERY FOR THE PATIENT.     HUB UNABLE TO WARM TRANSFER BETWEEN 12-1.     SPOUSE REQUESTING A CALL BACK

## 2022-06-28 DIAGNOSIS — Z20.822 ENCOUNTER FOR PREOPERATIVE SCREENING LABORATORY TESTING FOR COVID-19 VIRUS: Primary | ICD-10-CM

## 2022-06-28 DIAGNOSIS — Z01.812 ENCOUNTER FOR PREOPERATIVE SCREENING LABORATORY TESTING FOR COVID-19 VIRUS: Primary | ICD-10-CM

## 2022-07-16 ENCOUNTER — LAB (OUTPATIENT)
Dept: LAB | Facility: HOSPITAL | Age: 57
End: 2022-07-16

## 2022-07-16 DIAGNOSIS — Z20.822 ENCOUNTER FOR PREOPERATIVE SCREENING LABORATORY TESTING FOR COVID-19 VIRUS: ICD-10-CM

## 2022-07-16 DIAGNOSIS — Z01.812 ENCOUNTER FOR PREOPERATIVE SCREENING LABORATORY TESTING FOR COVID-19 VIRUS: ICD-10-CM

## 2022-07-16 LAB — SARS-COV-2 RNA NOSE QL NAA+PROBE: NOT DETECTED

## 2022-07-16 PROCEDURE — C9803 HOPD COVID-19 SPEC COLLECT: HCPCS

## 2022-07-16 PROCEDURE — U0004 COV-19 TEST NON-CDC HGH THRU: HCPCS

## 2022-07-19 ENCOUNTER — OUTSIDE FACILITY SERVICE (OUTPATIENT)
Dept: UROLOGY | Facility: CLINIC | Age: 57
End: 2022-07-19

## 2022-07-19 DIAGNOSIS — N20.0 NEPHROLITHIASIS: Primary | ICD-10-CM

## 2022-07-19 PROCEDURE — 52356 CYSTO/URETERO W/LITHOTRIPSY: CPT | Performed by: UROLOGY

## 2022-07-19 PROCEDURE — 76000 FLUOROSCOPY <1 HR PHYS/QHP: CPT | Performed by: UROLOGY

## 2022-07-19 RX ORDER — CIPROFLOXACIN 500 MG/1
500 TABLET, FILM COATED ORAL 2 TIMES DAILY
Qty: 6 TABLET | Refills: 0 | Status: SHIPPED | OUTPATIENT
Start: 2022-07-19 | End: 2023-01-05

## 2022-07-19 RX ORDER — OXYBUTYNIN CHLORIDE 10 MG/1
10 TABLET, EXTENDED RELEASE ORAL DAILY PRN
Qty: 10 TABLET | Refills: 0 | Status: SHIPPED | OUTPATIENT
Start: 2022-07-19 | End: 2023-01-05

## 2022-07-19 RX ORDER — PHENAZOPYRIDINE HYDROCHLORIDE 100 MG/1
100 TABLET, FILM COATED ORAL 3 TIMES DAILY PRN
Qty: 21 TABLET | Refills: 0 | Status: SHIPPED | OUTPATIENT
Start: 2022-07-19 | End: 2023-01-05

## 2022-07-19 RX ORDER — OXYCODONE HYDROCHLORIDE 5 MG/1
5 TABLET ORAL EVERY 6 HOURS PRN
Qty: 5 TABLET | Refills: 0 | Status: SHIPPED | OUTPATIENT
Start: 2022-07-19 | End: 2023-01-05

## 2022-07-19 RX ORDER — ACETAMINOPHEN 500 MG
1000 TABLET ORAL EVERY 6 HOURS
Qty: 30 TABLET | Refills: 0 | Status: SHIPPED | OUTPATIENT
Start: 2022-07-19 | End: 2022-07-22

## 2022-07-19 RX ORDER — DOCUSATE SODIUM 100 MG/1
100 CAPSULE, LIQUID FILLED ORAL 2 TIMES DAILY
Qty: 15 CAPSULE | Refills: 1 | Status: SHIPPED | OUTPATIENT
Start: 2022-07-19 | End: 2023-01-05

## 2022-07-22 ENCOUNTER — TELEPHONE (OUTPATIENT)
Dept: UROLOGY | Facility: CLINIC | Age: 57
End: 2022-07-22

## 2022-07-22 NOTE — TELEPHONE ENCOUNTER
UNABLE TO REACH CLINICAL TEAM.    Caller: LUIS F RODRIGUEZ     Relationship: SELF    Best call back number: [phone]    What is the best time to reach you: ANYTIME     INCOMING CALL FROM PT, PT WENT TO DR. ELIZONDO 7/19/22 FOR A KIDNEY STONE. HE IS CALLING IN THIS MORNING SAYING HE IS NOW IN EXTREME PAIN AGAIN AND THIS JUST STARTED HAPPENING. PLEASE FOLLOW UP WITH PT.

## 2022-12-14 ENCOUNTER — APPOINTMENT (OUTPATIENT)
Dept: CT IMAGING | Facility: HOSPITAL | Age: 57
End: 2022-12-14

## 2022-12-23 ENCOUNTER — HOSPITAL ENCOUNTER (OUTPATIENT)
Dept: CT IMAGING | Facility: HOSPITAL | Age: 57
Discharge: HOME | End: 2022-12-23
Admitting: UROLOGY

## 2022-12-23 DIAGNOSIS — N20.0 KIDNEY STONE ON LEFT SIDE: ICD-10-CM

## 2022-12-23 PROCEDURE — 74176 CT ABD & PELVIS W/O CONTRAST: CPT

## 2022-12-27 ENCOUNTER — TELEPHONE (OUTPATIENT)
Dept: UROLOGY | Facility: CLINIC | Age: 57
End: 2022-12-27

## 2022-12-27 NOTE — TELEPHONE ENCOUNTER
Caller: JASON    Relationship to patient: [employer]    Best call back number: [phone]    Patient is needing: SHE SAYS THEY ARE TRYING TO REACH PT REGARDING PRIOR AUTH FOR HIS CT ABDOMEN. SHE SAYS THEY HAVE CALLED THE PT'S PHONE# BUT THEY ARE UNABLE TO LEAVE HIM A VM SO THEY ARE REQUESTING THE OFFICE CONTACT PT [facility]. PT WILL NEED MEMBER ID AVAILABLE. CALL REF# [id number].

## 2023-01-05 ENCOUNTER — OFFICE VISIT (OUTPATIENT)
Dept: UROLOGY | Facility: CLINIC | Age: 58
End: 2023-01-05
Payer: COMMERCIAL

## 2023-01-05 VITALS
SYSTOLIC BLOOD PRESSURE: 138 MMHG | BODY MASS INDEX: 25.91 KG/M2 | WEIGHT: 181 LBS | TEMPERATURE: 98.2 F | OXYGEN SATURATION: 99 % | HEIGHT: 70 IN | HEART RATE: 69 BPM | DIASTOLIC BLOOD PRESSURE: 86 MMHG | RESPIRATION RATE: 16 BRPM

## 2023-01-05 DIAGNOSIS — N20.0 NEPHROLITHIASIS: Primary | ICD-10-CM

## 2023-01-05 DIAGNOSIS — N20.0 KIDNEY STONE ON LEFT SIDE: ICD-10-CM

## 2023-01-05 PROCEDURE — 81003 URINALYSIS AUTO W/O SCOPE: CPT | Performed by: PHYSICIAN ASSISTANT

## 2023-01-05 PROCEDURE — 99213 OFFICE O/P EST LOW 20 MIN: CPT | Performed by: PHYSICIAN ASSISTANT

## 2023-01-05 RX ORDER — POTASSIUM CITRATE 15 MEQ/1
15 TABLET, EXTENDED RELEASE ORAL 2 TIMES DAILY WITH MEALS
Qty: 60 TABLET | Refills: 11 | Status: SHIPPED | OUTPATIENT
Start: 2023-01-05

## 2023-01-05 NOTE — PROGRESS NOTES
Chief Complaint   Patient presents with   • Follow-up     6mo w/CT        HPI  Mr. Jha is a 57 y.o. male with history of nephrolithiasis who presents for follow up.     At this visit, tells me he no longer drinks pop, largely green tea and water.  He does not believe he has passed any stones.  He did take potassium citrate twice per day for some period of time, but recently had to stop for a short period due to transition and insurance and exorbitant cost.    Past Medical History:   Diagnosis Date   • Arthritis     SHOULDERS   • Body piercing     LEFT EAR   • Bursitis     Jeremy shoulders   • Colon polyps    • GERD (gastroesophageal reflux disease)    • Hard of hearing    • History of stomach ulcers    • Hypertension    • Kidney stones     DRINKING PEPSI   • No natural teeth    • Tendonitis     JEREMY shoulders   • Tinnitus    • Wears glasses     READING ONLY       Past Surgical History:   Procedure Laterality Date   • COLONOSCOPY     • COLONOSCOPY N/A 9/24/2018    Procedure: COLONOSCOPY WITH HOT BIOPSY POLYPECTOMY;  Surgeon: Xavi Lyman MD;  Location: [facility] ENDOSCOPY;  Service: Gastroenterology   • INGUINAL HERNIA REPAIR Left    • KIDNEY STONE SURGERY      X3   • KNEE ARTHROSCOPY Left     WITH REPAIR   • MULTIPLE TOOTH EXTRACTIONS     • URETEROSCOPY LASER LITHOTRIPSY WITH STENT INSERTION Left 9/8/2021    Procedure: URETEROSCOPY LEFT, CYSTOSCOPY LASER LITHOTRIPSY WITH STENT INSERTION ON LEFT, LEFT RETROGRADE PYELOGRAM;  Surgeon: Todd Prieto MD;  Location: [facility] OR;  Service: Urology;  Laterality: Left;         Current Outpatient Medications:   •  Potassium Citrate ER 15 MEQ (1620 MG) tablet controlled-release, Take 15 mEq by mouth 2 (Two) Times a Day With Meals., Disp: 60 tablet, Rfl: 11  •  lisinopril (PRINIVIL,ZESTRIL) 10 MG tablet, Take 10 mg by mouth Daily. 0.5 TABLET DAILY, Disp: , Rfl:   •  lisinopril-hydrochlorothiazide (PRINZIDE,ZESTORETIC) 10-12.5 MG per tablet, , Disp: , Rfl:      Physical  Exam  Visit Vitals  /86 (BP Location: Left arm, Patient Position: Sitting, Cuff Size: Adult)   Pulse 69   Temp 98.2 °F (36.8 °C) (Temporal)   Resp 16   Ht 177.8 cm (70\")   Wt 82.1 kg (181 lb)   SpO2 99%   BMI 25.97 kg/m²       Labs  Brief Urine Lab Results  (Last result in the past 365 days)      Color   Clarity   Blood   Leuk Est   Nitrite   Protein   CREAT   Urine HCG        01/05/23 1534 Yellow   Clear   Negative   Negative   Negative   Negative                 Lab Results   Component Value Date    GLUCOSE 106 (H) 09/08/2021    CALCIUM 8.8 09/08/2021     09/08/2021    K 4.2 09/08/2021    CO2 23.9 09/08/2021     09/08/2021    BUN 22 (H) 09/08/2021    CREATININE 1.03 09/08/2021    EGFRIFNONA 74 09/08/2021    BCR 21.4 09/08/2021    ANIONGAP 8.1 09/08/2021       Lab Results   Component Value Date    WBC 5.53 09/08/2021    HGB 14.1 09/08/2021    HCT 41.9 09/08/2021    MCV 91.3 09/08/2021     09/08/2021         Radiographic Studies  CT Abdomen Pelvis Stone Protocol    Result Date: 12/23/2022  1. No evidence of obstructing urinary tract stone disease or hydronephrosis. 2. Mild left nephrolithiasis.  This study was performed with techniques to keep radiation doses as low as reasonably achievable (ALARA). Individualized dose reduction techniques using automated exposure control or adjustment of vA and/or kV according to the patient size were employed.  This report was signed and finalized on 12/23/2022 4:27 PM by Tha Castillo MD.      Assessment  57 y.o. male with hx of nephrolithiasis, calcium oxalate.  His surveillance CT reveals a 3 mm left intrarenal stone only.  He has not passed any stones in the interim and is feeling well.  His urine is benign.    I have encouraged him to continue excellent fluid intake, ideally water.  I have encouraged him to take potassium citrate as possible.    Plan  1.  Follow-up in 1 year or sooner as needed

## 2023-08-16 ENCOUNTER — TELEPHONE (OUTPATIENT)
Dept: SURGERY | Facility: CLINIC | Age: 58
End: 2023-08-16
Payer: COMMERCIAL

## 2023-08-16 NOTE — TELEPHONE ENCOUNTER
Patient on 5 year recall for colonoscopy . Called and spoke with patient stayed he was losing insurance end of month and would like to have colonoscopy now. He is having heart cath next week so I scheduled him to see  Dr Lyman tomorrow

## 2023-08-17 ENCOUNTER — TELEPHONE (OUTPATIENT)
Dept: SURGERY | Facility: CLINIC | Age: 58
End: 2023-08-17
Payer: COMMERCIAL

## 2023-08-17 NOTE — TELEPHONE ENCOUNTER
"Pt was on schedule today for evaluation for 5 year recall colonoscopy, I called pt regarding scheduling \"open access.\"  Pt stated \"I need it done this month because my insurance runs out the end of the month, I am getting new insurance but I don't know when\".  Pt has [employer] insurance therefore he must be scheduled at [facility] because [facility] is out on network with his insurance.  I offered pt 08/23/2023 @ [facility], he stated \"I cannot do that day, I have something going on!\"  Pt stated \"I will see what I can do and call you back, we might have to wait until my new insurance goes into effect.\"  "

## 2023-08-25 NOTE — TELEPHONE ENCOUNTER
"I talked with pt's wife Virginia, she stated \"we are still working on our new insurance [email or web address] we will call you as soon as we get it!\"  "

## (undated) DEVICE — ST FLD IRR WARM

## (undated) DEVICE — DRSNG SURESITE HNDL 4X5

## (undated) DEVICE — ADAPT UROLOK

## (undated) DEVICE — SOL IRR NACL 0.9PCT 3000ML

## (undated) DEVICE — SLV SCD CALF HEMOFORCE DVT THERP REPROC MD

## (undated) DEVICE — NITINOL WIRE WITH HYDROPHILIC TIP: Brand: SENSOR

## (undated) DEVICE — GLV SURG SENSICARE W/ALOE PF LF 8.5 STRL

## (undated) DEVICE — KT ORCA VLV SXN AIR/H2O W/SEAL 1P/U STRL

## (undated) DEVICE — ENDOGATOR AUXILIARY WATER JET CONNECTOR: Brand: ENDOGATOR

## (undated) DEVICE — DILATOR/SHEATH SET: Brand: 8/10 DILATOR/SHEATH SET

## (undated) DEVICE — NITINOL STONE RETRIEVAL BASKET: Brand: ZERO TIP

## (undated) DEVICE — CATH URETRL AP 18F

## (undated) DEVICE — RICH CYSTO: Brand: MEDLINE INDUSTRIES, INC.

## (undated) DEVICE — JELLY,LUBE,STERILE,FLIP TOP,TUBE,2-OZ: Brand: MEDLINE

## (undated) DEVICE — DUAL LUMEN URETERAL CATHETER

## (undated) DEVICE — GLV SURG SENSICARE LT W/ALOE PF LF 7 STRL

## (undated) DEVICE — STRIP,CLOSURE,WOUND,MEDI-STRIP,1/2X4: Brand: MEDLINE

## (undated) DEVICE — PAD GRND REM POLYHESIVE A/ DISP

## (undated) DEVICE — DRSNG SURESITE123 2.4X2.8IN

## (undated) DEVICE — GW AMPLTZ SUPRSTF PTFE JB .035 7X145CM

## (undated) DEVICE — FRCP BIOP RADLJAW4 HOT 2.2X240 BX40

## (undated) DEVICE — Device

## (undated) DEVICE — FIBR LASR HOLMIUM SLIMLINE SIS EZ 365U

## (undated) DEVICE — ADHS LIQ MASTISOL 2/3ML